# Patient Record
Sex: FEMALE | Race: WHITE | NOT HISPANIC OR LATINO | Employment: OTHER | URBAN - METROPOLITAN AREA
[De-identification: names, ages, dates, MRNs, and addresses within clinical notes are randomized per-mention and may not be internally consistent; named-entity substitution may affect disease eponyms.]

---

## 2023-03-22 DIAGNOSIS — I10 PRIMARY HYPERTENSION: Primary | ICD-10-CM

## 2023-03-22 RX ORDER — TRIAMTERENE AND HYDROCHLOROTHIAZIDE 37.5; 25 MG/1; MG/1
1 CAPSULE ORAL DAILY
COMMUNITY
Start: 2015-08-04 | End: 2023-03-22 | Stop reason: SDUPTHER

## 2023-03-24 RX ORDER — TRIAMTERENE AND HYDROCHLOROTHIAZIDE 37.5; 25 MG/1; MG/1
1 CAPSULE ORAL DAILY
Qty: 90 CAPSULE | Refills: 3 | Status: SHIPPED | OUTPATIENT
Start: 2023-03-24 | End: 2024-03-18 | Stop reason: SDUPTHER

## 2023-05-08 DIAGNOSIS — F41.9 ANXIETY: ICD-10-CM

## 2023-05-08 RX ORDER — BUSPIRONE HYDROCHLORIDE 10 MG/1
10 TABLET ORAL 2 TIMES DAILY
COMMUNITY
End: 2023-05-08 | Stop reason: SDUPTHER

## 2023-05-09 RX ORDER — BUSPIRONE HYDROCHLORIDE 10 MG/1
10 TABLET ORAL 2 TIMES DAILY
Qty: 180 TABLET | Refills: 3 | Status: SHIPPED | OUTPATIENT
Start: 2023-05-09 | End: 2024-05-08

## 2023-06-02 DIAGNOSIS — Z86.39 HISTORY OF ELEVATED LIPIDS: ICD-10-CM

## 2023-06-02 RX ORDER — ATORVASTATIN CALCIUM 20 MG/1
20 TABLET, FILM COATED ORAL NIGHTLY
Qty: 30 TABLET | Refills: 0 | Status: SHIPPED | OUTPATIENT
Start: 2023-06-02 | End: 2023-06-29

## 2023-06-02 RX ORDER — ATORVASTATIN CALCIUM 20 MG/1
20 TABLET, FILM COATED ORAL NIGHTLY
COMMUNITY
End: 2023-06-02 | Stop reason: SDUPTHER

## 2023-06-29 DIAGNOSIS — Z86.39 HISTORY OF ELEVATED LIPIDS: ICD-10-CM

## 2023-06-29 RX ORDER — ATORVASTATIN CALCIUM 20 MG/1
20 TABLET, FILM COATED ORAL NIGHTLY
Qty: 90 TABLET | Refills: 0 | Status: SHIPPED | OUTPATIENT
Start: 2023-06-29 | End: 2023-09-28 | Stop reason: SDUPTHER

## 2023-08-03 ENCOUNTER — LAB (OUTPATIENT)
Dept: LAB | Facility: LAB | Age: 73
End: 2023-08-03
Payer: MEDICARE

## 2023-08-03 DIAGNOSIS — Z00.00 HEALTHCARE MAINTENANCE: ICD-10-CM

## 2023-08-03 LAB
ALANINE AMINOTRANSFERASE (SGPT) (U/L) IN SER/PLAS: 31 U/L (ref 7–45)
ALBUMIN (G/DL) IN SER/PLAS: 4.2 G/DL (ref 3.4–5)
ALKALINE PHOSPHATASE (U/L) IN SER/PLAS: 85 U/L (ref 33–136)
ANION GAP IN SER/PLAS: 13 MMOL/L (ref 10–20)
ASPARTATE AMINOTRANSFERASE (SGOT) (U/L) IN SER/PLAS: 25 U/L (ref 9–39)
BASOPHILS (10*3/UL) IN BLOOD BY AUTOMATED COUNT: 0.02 X10E9/L (ref 0–0.1)
BASOPHILS/100 LEUKOCYTES IN BLOOD BY AUTOMATED COUNT: 0.3 % (ref 0–2)
BILIRUBIN TOTAL (MG/DL) IN SER/PLAS: 0.4 MG/DL (ref 0–1.2)
CALCIUM (MG/DL) IN SER/PLAS: 9.7 MG/DL (ref 8.6–10.6)
CARBON DIOXIDE, TOTAL (MMOL/L) IN SER/PLAS: 29 MMOL/L (ref 21–32)
CHLORIDE (MMOL/L) IN SER/PLAS: 101 MMOL/L (ref 98–107)
CHOLESTEROL (MG/DL) IN SER/PLAS: 261 MG/DL (ref 0–199)
CHOLESTEROL IN HDL (MG/DL) IN SER/PLAS: 51.6 MG/DL
CHOLESTEROL/HDL RATIO: 5.1
CREATININE (MG/DL) IN SER/PLAS: 0.65 MG/DL (ref 0.5–1.05)
EOSINOPHILS (10*3/UL) IN BLOOD BY AUTOMATED COUNT: 0.12 X10E9/L (ref 0–0.4)
EOSINOPHILS/100 LEUKOCYTES IN BLOOD BY AUTOMATED COUNT: 2 % (ref 0–6)
ERYTHROCYTE DISTRIBUTION WIDTH (RATIO) BY AUTOMATED COUNT: 13.7 % (ref 11.5–14.5)
ERYTHROCYTE MEAN CORPUSCULAR HEMOGLOBIN CONCENTRATION (G/DL) BY AUTOMATED: 32.9 G/DL (ref 32–36)
ERYTHROCYTE MEAN CORPUSCULAR VOLUME (FL) BY AUTOMATED COUNT: 101 FL (ref 80–100)
ERYTHROCYTES (10*6/UL) IN BLOOD BY AUTOMATED COUNT: 4.47 X10E12/L (ref 4–5.2)
GFR FEMALE: >90 ML/MIN/1.73M2
GLUCOSE (MG/DL) IN SER/PLAS: 107 MG/DL (ref 74–99)
HEMATOCRIT (%) IN BLOOD BY AUTOMATED COUNT: 45.3 % (ref 36–46)
HEMOGLOBIN (G/DL) IN BLOOD: 14.9 G/DL (ref 12–16)
IMMATURE GRANULOCYTES/100 LEUKOCYTES IN BLOOD BY AUTOMATED COUNT: 0.3 % (ref 0–0.9)
LDL: ABNORMAL MG/DL (ref 0–99)
LEUKOCYTES (10*3/UL) IN BLOOD BY AUTOMATED COUNT: 6.1 X10E9/L (ref 4.4–11.3)
LYMPHOCYTES (10*3/UL) IN BLOOD BY AUTOMATED COUNT: 1.74 X10E9/L (ref 0.8–3)
LYMPHOCYTES/100 LEUKOCYTES IN BLOOD BY AUTOMATED COUNT: 28.5 % (ref 13–44)
MONOCYTES (10*3/UL) IN BLOOD BY AUTOMATED COUNT: 0.4 X10E9/L (ref 0.05–0.8)
MONOCYTES/100 LEUKOCYTES IN BLOOD BY AUTOMATED COUNT: 6.6 % (ref 2–10)
NEUTROPHILS (10*3/UL) IN BLOOD BY AUTOMATED COUNT: 3.8 X10E9/L (ref 1.6–5.5)
NEUTROPHILS/100 LEUKOCYTES IN BLOOD BY AUTOMATED COUNT: 62.3 % (ref 40–80)
NON HDL CHOLESTEROL: 209 MG/DL
NRBC (PER 100 WBCS) BY AUTOMATED COUNT: 0 /100 WBC (ref 0–0)
PLATELETS (10*3/UL) IN BLOOD AUTOMATED COUNT: 280 X10E9/L (ref 150–450)
POTASSIUM (MMOL/L) IN SER/PLAS: 4.5 MMOL/L (ref 3.5–5.3)
PROTEIN TOTAL: 6.7 G/DL (ref 6.4–8.2)
SODIUM (MMOL/L) IN SER/PLAS: 138 MMOL/L (ref 136–145)
THYROTROPIN (MIU/L) IN SER/PLAS BY DETECTION LIMIT <= 0.05 MIU/L: 9.83 MIU/L (ref 0.44–3.98)
THYROXINE (T4) FREE (NG/DL) IN SER/PLAS: 0.97 NG/DL (ref 0.78–1.48)
TRIGLYCERIDE (MG/DL) IN SER/PLAS: 490 MG/DL (ref 0–149)
UREA NITROGEN (MG/DL) IN SER/PLAS: 15 MG/DL (ref 6–23)
VLDL: ABNORMAL MG/DL (ref 0–40)

## 2023-08-03 PROCEDURE — 84443 ASSAY THYROID STIM HORMONE: CPT

## 2023-08-03 PROCEDURE — 80061 LIPID PANEL: CPT

## 2023-08-03 PROCEDURE — 80053 COMPREHEN METABOLIC PANEL: CPT

## 2023-08-03 PROCEDURE — 36415 COLL VENOUS BLD VENIPUNCTURE: CPT

## 2023-08-03 PROCEDURE — 84439 ASSAY OF FREE THYROXINE: CPT

## 2023-08-03 PROCEDURE — 85025 COMPLETE CBC W/AUTO DIFF WBC: CPT

## 2023-08-11 ENCOUNTER — OFFICE VISIT (OUTPATIENT)
Dept: PRIMARY CARE | Facility: CLINIC | Age: 73
End: 2023-08-11
Payer: MEDICARE

## 2023-08-11 VITALS
WEIGHT: 160.6 LBS | RESPIRATION RATE: 18 BRPM | SYSTOLIC BLOOD PRESSURE: 93 MMHG | HEART RATE: 94 BPM | OXYGEN SATURATION: 97 % | DIASTOLIC BLOOD PRESSURE: 68 MMHG | HEIGHT: 61 IN | BODY MASS INDEX: 30.32 KG/M2

## 2023-08-11 DIAGNOSIS — E03.9 HYPOTHYROIDISM, UNSPECIFIED TYPE: ICD-10-CM

## 2023-08-11 DIAGNOSIS — Z00.00 ANNUAL PHYSICAL EXAM: Primary | ICD-10-CM

## 2023-08-11 DIAGNOSIS — Z00.00 HEALTHCARE MAINTENANCE: ICD-10-CM

## 2023-08-11 PROBLEM — M25.612 DECREASED RANGE OF MOTION OF LEFT SHOULDER: Status: ACTIVE | Noted: 2023-08-11

## 2023-08-11 PROBLEM — H61.23 IMPACTED CERUMEN OF BOTH EARS: Status: ACTIVE | Noted: 2023-08-11

## 2023-08-11 PROBLEM — R25.1 TREMOR: Status: ACTIVE | Noted: 2023-08-11

## 2023-08-11 PROBLEM — R29.898 WEAKNESS OF LEFT UPPER EXTREMITY: Status: ACTIVE | Noted: 2023-08-11

## 2023-08-11 PROBLEM — F41.9 ANXIETY: Status: ACTIVE | Noted: 2023-08-11

## 2023-08-11 PROBLEM — R05.9 COUGH: Status: ACTIVE | Noted: 2023-08-11

## 2023-08-11 PROBLEM — R20.2 HAND TINGLING: Status: ACTIVE | Noted: 2023-08-11

## 2023-08-11 PROBLEM — E78.2 ELEVATED CHOLESTEROL WITH ELEVATED TRIGLYCERIDES: Status: ACTIVE | Noted: 2023-08-11

## 2023-08-11 PROBLEM — I67.1 MIDDLE CEREBRAL ARTERY ANEURYSM (HHS-HCC): Status: ACTIVE | Noted: 2023-08-11

## 2023-08-11 PROBLEM — I10 HTN (HYPERTENSION): Status: ACTIVE | Noted: 2023-08-11

## 2023-08-11 PROBLEM — G25.2: Status: ACTIVE | Noted: 2023-08-11

## 2023-08-11 PROBLEM — R92.8 ABNORMAL MAMMOGRAM: Status: ACTIVE | Noted: 2023-08-11

## 2023-08-11 PROBLEM — R32 URINARY INCONTINENCE: Status: ACTIVE | Noted: 2023-08-11

## 2023-08-11 PROBLEM — R42 DIZZINESS: Status: ACTIVE | Noted: 2023-08-11

## 2023-08-11 PROBLEM — Z98.890 HISTORY OF CRANIOPLASTY: Status: ACTIVE | Noted: 2023-08-11

## 2023-08-11 PROBLEM — I69.319 RESIDUAL COGNITIVE DEFICIT AS LATE EFFECT OF STROKE: Status: ACTIVE | Noted: 2023-08-11

## 2023-08-11 PROBLEM — R20.2 TINGLING OF LEFT UPPER EXTREMITY: Status: ACTIVE | Noted: 2023-08-11

## 2023-08-11 PROCEDURE — 1159F MED LIST DOCD IN RCRD: CPT | Performed by: INTERNAL MEDICINE

## 2023-08-11 PROCEDURE — G0439 PPPS, SUBSEQ VISIT: HCPCS | Performed by: INTERNAL MEDICINE

## 2023-08-11 PROCEDURE — 1170F FXNL STATUS ASSESSED: CPT | Performed by: INTERNAL MEDICINE

## 2023-08-11 PROCEDURE — 3078F DIAST BP <80 MM HG: CPT | Performed by: INTERNAL MEDICINE

## 2023-08-11 PROCEDURE — 1036F TOBACCO NON-USER: CPT | Performed by: INTERNAL MEDICINE

## 2023-08-11 PROCEDURE — 3074F SYST BP LT 130 MM HG: CPT | Performed by: INTERNAL MEDICINE

## 2023-08-11 RX ORDER — CLINDAMYCIN PHOSPHATE 10 MG/G
GEL TOPICAL
COMMUNITY
Start: 2023-07-11

## 2023-08-11 RX ORDER — LEVOTHYROXINE SODIUM 75 UG/1
75 TABLET ORAL DAILY
COMMUNITY
End: 2023-08-11 | Stop reason: ALTCHOICE

## 2023-08-11 RX ORDER — LEVETIRACETAM 500 MG/1
500 TABLET ORAL 2 TIMES DAILY
COMMUNITY
End: 2024-01-03 | Stop reason: SDUPTHER

## 2023-08-11 RX ORDER — PRIMIDONE 50 MG/1
TABLET ORAL
COMMUNITY

## 2023-08-11 RX ORDER — LEVOTHYROXINE SODIUM 88 UG/1
88 TABLET ORAL DAILY
Qty: 90 TABLET | Refills: 3 | Status: SHIPPED | OUTPATIENT
Start: 2023-08-11 | End: 2024-08-10

## 2023-08-11 ASSESSMENT — PATIENT HEALTH QUESTIONNAIRE - PHQ9
2. FEELING DOWN, DEPRESSED OR HOPELESS: NOT AT ALL
1. LITTLE INTEREST OR PLEASURE IN DOING THINGS: NOT AT ALL
1. LITTLE INTEREST OR PLEASURE IN DOING THINGS: SEVERAL DAYS
SUM OF ALL RESPONSES TO PHQ9 QUESTIONS 1 AND 2: 0
10. IF YOU CHECKED OFF ANY PROBLEMS, HOW DIFFICULT HAVE THESE PROBLEMS MADE IT FOR YOU TO DO YOUR WORK, TAKE CARE OF THINGS AT HOME, OR GET ALONG WITH OTHER PEOPLE: SOMEWHAT DIFFICULT
SUM OF ALL RESPONSES TO PHQ9 QUESTIONS 1 AND 2: 1
2. FEELING DOWN, DEPRESSED OR HOPELESS: NOT AT ALL

## 2023-08-11 ASSESSMENT — ACTIVITIES OF DAILY LIVING (ADL)
DRESSING: INDEPENDENT
BATHING: INDEPENDENT
MANAGING_FINANCES: TOTAL CARE
GROCERY_SHOPPING: NEEDS ASSISTANCE
TAKING_MEDICATION: INDEPENDENT
DOING_HOUSEWORK: NEEDS ASSISTANCE

## 2023-08-11 ASSESSMENT — ENCOUNTER SYMPTOMS
DEPRESSION: 0
OCCASIONAL FEELINGS OF UNSTEADINESS: 0
LOSS OF SENSATION IN FEET: 0

## 2023-08-11 NOTE — PROGRESS NOTES
"Subjective   Patient ID: Paola Gonzales is a 73 y.o. female who presents for Medicare Annual Wellness Visit Subsequent.    73 F    H/o  SAH x 10 years    Rubral Tremor    Anxiety/ATS                        HPI     Review of Systems      No Fever/chills/headaches/dizziness/chest pains/ shortness of breath/palpitations/Nausea/vomiting/diarrhea/ constipation/urine frequency/blood in urine.      Objective   BP 93/68 (BP Location: Left arm, Patient Position: Sitting, BP Cuff Size: Adult)   Pulse 94   Resp 18   Ht 1.542 m (5' 0.71\")   Wt 72.8 kg (160 lb 9.6 oz)   SpO2 97%   BMI 30.64 kg/m²     Physical Exam    No JVP elevation. No palpable Lymph Nodes. No Thyromegaly    CVS-NL S1/S2 . No MRG    Lungs-CTA. B/S= B/L    Abdomen-Soft, Non-tender. No masses or HSM    Extremities: No C/C/E    CNS:    Rubral Tremor    A+OX3              Assessment/Plan        73 F    H/o  SAH x 10 years    Rubral Tremor    Anxiety/ATS    Elevated TSH    Plan:    Continue with THC 5mg    Increase Synthroid 88 mcg daily    TSH/Free T4 in 8 weeks    Follow up/ Call with any concerns    Follow up in 12 months /PRN          "

## 2023-09-28 DIAGNOSIS — Z86.39 HISTORY OF ELEVATED LIPIDS: ICD-10-CM

## 2023-09-28 RX ORDER — ATORVASTATIN CALCIUM 20 MG/1
20 TABLET, FILM COATED ORAL NIGHTLY
Qty: 90 TABLET | Refills: 3 | Status: SHIPPED | OUTPATIENT
Start: 2023-09-28 | End: 2024-03-29 | Stop reason: SDUPTHER

## 2023-09-30 ENCOUNTER — APPOINTMENT (OUTPATIENT)
Dept: RADIOLOGY | Facility: HOSPITAL | Age: 73
End: 2023-09-30
Payer: MEDICARE

## 2023-09-30 ENCOUNTER — APPOINTMENT (OUTPATIENT)
Dept: CARDIOLOGY | Facility: HOSPITAL | Age: 73
End: 2023-09-30
Payer: MEDICARE

## 2023-09-30 ENCOUNTER — HOSPITAL ENCOUNTER (EMERGENCY)
Facility: HOSPITAL | Age: 73
Discharge: HOME | End: 2023-09-30
Attending: EMERGENCY MEDICINE
Payer: MEDICARE

## 2023-09-30 VITALS
WEIGHT: 164.9 LBS | RESPIRATION RATE: 20 BRPM | DIASTOLIC BLOOD PRESSURE: 74 MMHG | SYSTOLIC BLOOD PRESSURE: 124 MMHG | OXYGEN SATURATION: 93 % | TEMPERATURE: 98.2 F | BODY MASS INDEX: 31.46 KG/M2 | HEART RATE: 82 BPM

## 2023-09-30 DIAGNOSIS — R20.2 PARESTHESIA: ICD-10-CM

## 2023-09-30 DIAGNOSIS — I63.9 STROKE (CEREBRUM) (MULTI): Primary | ICD-10-CM

## 2023-09-30 LAB
ALBUMIN SERPL BCP-MCNC: 4.1 G/DL (ref 3.4–5)
ALP SERPL-CCNC: 74 U/L (ref 33–136)
ALT SERPL W P-5'-P-CCNC: 31 U/L (ref 7–45)
ANION GAP SERPL CALC-SCNC: 16 MMOL/L (ref 10–20)
APTT PPP: 34 SECONDS (ref 27–38)
AST SERPL W P-5'-P-CCNC: 28 U/L (ref 9–39)
BASOPHILS # BLD AUTO: 0.02 X10*3/UL (ref 0–0.1)
BASOPHILS NFR BLD AUTO: 0.3 %
BILIRUB SERPL-MCNC: 0.3 MG/DL (ref 0–1.2)
BUN SERPL-MCNC: 12 MG/DL (ref 6–23)
CALCIUM SERPL-MCNC: 8.9 MG/DL (ref 8.6–10.3)
CARDIAC TROPONIN I PNL SERPL HS: 4 NG/L (ref 0–13)
CHLORIDE SERPL-SCNC: 101 MMOL/L (ref 98–107)
CO2 SERPL-SCNC: 23 MMOL/L (ref 21–32)
CREAT SERPL-MCNC: 0.61 MG/DL (ref 0.5–1.05)
EOSINOPHIL # BLD AUTO: 0.09 X10*3/UL (ref 0–0.4)
EOSINOPHIL NFR BLD AUTO: 1.2 %
ERYTHROCYTE [DISTWIDTH] IN BLOOD BY AUTOMATED COUNT: 13.3 % (ref 11.5–14.5)
GFR SERPL CREATININE-BSD FRML MDRD: >90 ML/MIN/1.73M*2
GLUCOSE SERPL-MCNC: 131 MG/DL (ref 74–99)
HCT VFR BLD AUTO: 44 % (ref 36–46)
HGB BLD-MCNC: 15.1 G/DL (ref 12–16)
IMM GRANULOCYTES # BLD AUTO: 0.03 X10*3/UL (ref 0–0.5)
IMM GRANULOCYTES NFR BLD AUTO: 0.4 % (ref 0–0.9)
INR PPP: 1.1 (ref 0.9–1.1)
LYMPHOCYTES # BLD AUTO: 1.96 X10*3/UL (ref 0.8–3)
LYMPHOCYTES NFR BLD AUTO: 25.3 %
MCH RBC QN AUTO: 33 PG (ref 26–34)
MCHC RBC AUTO-ENTMCNC: 34.3 G/DL (ref 32–36)
MCV RBC AUTO: 96 FL (ref 80–100)
MONOCYTES # BLD AUTO: 0.52 X10*3/UL (ref 0.05–0.8)
MONOCYTES NFR BLD AUTO: 6.7 %
NEUTROPHILS # BLD AUTO: 5.13 X10*3/UL (ref 1.6–5.5)
NEUTROPHILS NFR BLD AUTO: 66.1 %
NRBC BLD-RTO: 0 /100 WBCS (ref 0–0)
PLATELET # BLD AUTO: 304 X10*3/UL (ref 150–450)
PMV BLD AUTO: 9.4 FL (ref 7.5–11.5)
POTASSIUM SERPL-SCNC: 3.6 MMOL/L (ref 3.5–5.3)
PROT SERPL-MCNC: 6.5 G/DL (ref 6.4–8.2)
PROTHROMBIN TIME: 12.2 SECONDS (ref 9.8–12.8)
RBC # BLD AUTO: 4.57 X10*6/UL (ref 4–5.2)
SODIUM SERPL-SCNC: 136 MMOL/L (ref 136–145)
WBC # BLD AUTO: 7.8 X10*3/UL (ref 4.4–11.3)

## 2023-09-30 PROCEDURE — 80053 COMPREHEN METABOLIC PANEL: CPT | Performed by: EMERGENCY MEDICINE

## 2023-09-30 PROCEDURE — 71045 X-RAY EXAM CHEST 1 VIEW: CPT | Mod: FY

## 2023-09-30 PROCEDURE — 85730 THROMBOPLASTIN TIME PARTIAL: CPT | Performed by: EMERGENCY MEDICINE

## 2023-09-30 PROCEDURE — 99285 EMERGENCY DEPT VISIT HI MDM: CPT | Mod: 25 | Performed by: EMERGENCY MEDICINE

## 2023-09-30 PROCEDURE — 36415 COLL VENOUS BLD VENIPUNCTURE: CPT | Performed by: EMERGENCY MEDICINE

## 2023-09-30 PROCEDURE — 85610 PROTHROMBIN TIME: CPT | Performed by: EMERGENCY MEDICINE

## 2023-09-30 PROCEDURE — 70450 CT HEAD/BRAIN W/O DYE: CPT

## 2023-09-30 PROCEDURE — 70450 CT HEAD/BRAIN W/O DYE: CPT | Performed by: RADIOLOGY

## 2023-09-30 PROCEDURE — 99284 EMERGENCY DEPT VISIT MOD MDM: CPT | Performed by: EMERGENCY MEDICINE

## 2023-09-30 PROCEDURE — 93005 ELECTROCARDIOGRAM TRACING: CPT

## 2023-09-30 PROCEDURE — 85025 COMPLETE CBC W/AUTO DIFF WBC: CPT | Performed by: EMERGENCY MEDICINE

## 2023-09-30 PROCEDURE — 84484 ASSAY OF TROPONIN QUANT: CPT | Performed by: EMERGENCY MEDICINE

## 2023-09-30 PROCEDURE — 71045 X-RAY EXAM CHEST 1 VIEW: CPT | Performed by: RADIOLOGY

## 2023-09-30 ASSESSMENT — PAIN - FUNCTIONAL ASSESSMENT: PAIN_FUNCTIONAL_ASSESSMENT: 0-10

## 2023-09-30 ASSESSMENT — COLUMBIA-SUICIDE SEVERITY RATING SCALE - C-SSRS
6. HAVE YOU EVER DONE ANYTHING, STARTED TO DO ANYTHING, OR PREPARED TO DO ANYTHING TO END YOUR LIFE?: NO
1. IN THE PAST MONTH, HAVE YOU WISHED YOU WERE DEAD OR WISHED YOU COULD GO TO SLEEP AND NOT WAKE UP?: NO
2. HAVE YOU ACTUALLY HAD ANY THOUGHTS OF KILLING YOURSELF?: NO

## 2023-09-30 ASSESSMENT — PAIN SCALES - GENERAL: PAINLEVEL_OUTOF10: 0 - NO PAIN

## 2023-09-30 NOTE — ED PROVIDER NOTES
HPI   Chief Complaint   Patient presents with    Stroke     PT TO ED WITH C/O FACIAL NUMBNESS. LKW 1230. UPON ARRIVAL S/S HAVE RESOLVED. HX BRAIN ANEURYSM WITH TITANIUM MESH. DENIES WEAKNESS, FACIAL DROOP, SLURRED SPEECH, DIZZINESS       History of present illness 73-year-old female arrives with paresthesias to the top of her head.  She has had prior brain surgery with a titanium clip for subarachnoid hemorrhage.  She has had no seizure activity she has no focal deficits for either motor or sensory.  The paresthesias are only felt on her scalp.  There is no nausea vomiting there is no chest pain there is no shortness of breath.  There is complete resolution of symptoms on arrival.        ROS  General Appears in distress  HEENT: No sore throat, No Visual Loss, No Headache, No Ear Pain  Neck: Denies neck pain  Chest: No chest pain, no pleuritic pain, no chest wall injury  Pulmonary: No SOB, No Cough, No Sputum production, No Wheezing  GI: No abdominal pain, no nausea or vomiting, no diarrhea.  : No dysuria, no frequency, no hematuria.  Extremities: No musculoskeletal pain, normal ambulation, no paresthesia.  Psych: Normal interaction, no anxiety, no depression, no suicidal ideation  Skin: No rashes    ROS is otherwise negative     PE: General: Appears in distress        HEENT: Throat is moist without exudate, midline uvula dentate intact, Tms clear with normal anatomy.        Neck: Supple non tender        Chest CTA, good AE, no wheezing, rales, or rhonci        CVA: RRR S1S2 no S3S4 or murmur        ABD: W/S/NT no HSM, no pulsatile masses, good bowel sounds        Extremities: Excellent distal pulses, brisk capillary refill. Full ROM        Psych: Normal interactions with no signs of depression  or suicidal ideation.        Neuro: Alert and oriented, moves all and feels all.        Results for orders placed or performed during the hospital encounter of 09/30/23   aPTT   Result Value Ref Range    aPTT 34 27 - 38  seconds   Protime-INR   Result Value Ref Range    Protime 12.2 9.8 - 12.8 seconds    INR 1.1 0.9 - 1.1   Comprehensive Metabolic Panel   Result Value Ref Range    Glucose 131 (H) 74 - 99 mg/dL    Sodium 136 136 - 145 mmol/L    Potassium 3.6 3.5 - 5.3 mmol/L    Chloride 101 98 - 107 mmol/L    Bicarbonate 23 21 - 32 mmol/L    Anion Gap 16 10 - 20 mmol/L    Urea Nitrogen 12 6 - 23 mg/dL    Creatinine 0.61 0.50 - 1.05 mg/dL    eGFR >90 >60 mL/min/1.73m*2    Calcium 8.9 8.6 - 10.3 mg/dL    Albumin 4.1 3.4 - 5.0 g/dL    Alkaline Phosphatase 74 33 - 136 U/L    Total Protein 6.5 6.4 - 8.2 g/dL    AST 28 9 - 39 U/L    Bilirubin, Total 0.3 0.0 - 1.2 mg/dL    ALT 31 7 - 45 U/L   CBC and Auto Differential   Result Value Ref Range    WBC 7.8 4.4 - 11.3 x10*3/uL    nRBC 0.0 0.0 - 0.0 /100 WBCs    RBC 4.57 4.00 - 5.20 x10*6/uL    Hemoglobin 15.1 12.0 - 16.0 g/dL    Hematocrit 44.0 36.0 - 46.0 %    MCV 96 80 - 100 fL    MCH 33.0 26.0 - 34.0 pg    MCHC 34.3 32.0 - 36.0 g/dL    RDW 13.3 11.5 - 14.5 %    Platelets 304 150 - 450 x10*3/uL    MPV 9.4 7.5 - 11.5 fL    Neutrophils % 66.1 40.0 - 80.0 %    Immature Granulocytes %, Automated 0.4 0.0 - 0.9 %    Lymphocytes % 25.3 13.0 - 44.0 %    Monocytes % 6.7 2.0 - 10.0 %    Eosinophils % 1.2 0.0 - 6.0 %    Basophils % 0.3 0.0 - 2.0 %    Neutrophils Absolute 5.13 1.60 - 5.50 x10*3/uL    Immature Granulocytes Absolute, Automated 0.03 0.00 - 0.50 x10*3/uL    Lymphocytes Absolute 1.96 0.80 - 3.00 x10*3/uL    Monocytes Absolute 0.52 0.05 - 0.80 x10*3/uL    Eosinophils Absolute 0.09 0.00 - 0.40 x10*3/uL    Basophils Absolute 0.02 0.00 - 0.10 x10*3/uL   Troponin I, High Sensitivity   Result Value Ref Range    Troponin I, High Sensitivity 4 0 - 13 ng/L      XR chest 1 view   Final Result   1.  Stable chest. See discussion above.                  MACRO:   None        Signed by: Joseph Schoenberger 9/30/2023 3:12 PM   Dictation workstation:   [id number]      CT brain attack head wo IV contrast    Final Result   No acute intracranial abnormalities. Large area of encephalomalacia   on the right consistent with previous surgery.        The findings were discussed with Dr. Ayers at 1:38 p.m..                       Signed by: Alexa Kaplan 9/30/2023 1:39 PM   Dictation workstation:   [id number]                      [place] Coma Scale Score: 15                  Patient History   Past Medical History:   Diagnosis Date    Encounter for screening for malignant neoplasm of colon 09/14/2018    Colon cancer screening    Nontraumatic subarachnoid hemorrhage, unspecified (CMS/[facility]) 08/10/2017    Subarachnoid hemorrhage, nontraumatic    Other conditions influencing health status     Closed Fracture Of The Fifth Metatarsal    Personal history of other infectious and parasitic diseases 09/27/2019    History of wound infection    Unspecified open wound of scalp, initial encounter 09/14/2018    Scalp wound     Past Surgical History:   Procedure Laterality Date    CT HEAD ANGIO W AND WO IV CONTRAST  7/27/2013    CT HEAD ANGIO W AND WO IV CONTRAST 7/27/2013 [facility] CLINICAL LEGACY    CT HEAD ANGIO W AND WO IV CONTRAST  8/6/2013    CT HEAD ANGIO W AND WO IV CONTRAST 8/6/2013 [facility] CLINICAL LEGACY    CT HEAD ANGIO W AND WO IV CONTRAST  10/4/2017    [place] HEAD ANGIO W AND WO IV CONTRAST [place] [facility] [place]    MR HEAD ANGIO W AND WO IV CONTRAST  8/2/2014    MR HEAD ANGIO W AND WO IV CONTRAST [place] [facility] [place]    MR HEAD ANGIO W AND WO IV CONTRAST  10/7/2019    MR HEAD ANGIO W AND WO IV CONTRAST 10[place] [facility] [place] [place]    MR HEAD ANGIO WO IV CONTRAST  8/10/2015    MR HEAD ANGIO WO IV CONTRAST 8[place]10/2015 [facility] [place]    OTHER SURGICAL HISTORY  09/20/2013    Craniotomy (Therapeutic)     No family history on file.  Social History     Tobacco Use    Smoking status: Former     Types: Cigarettes     Quit date: 2013     Years since quitting: 10.7    Smokeless tobacco: Never   Substance Use Topics    Alcohol  use: Not Currently    Drug use: Yes     Types: Marijuana     Comment: medical marijuana       Physical Exam   ED Triage Vitals [09/30/23 1333]   Temp Heart Rate Resp BP   36.8 °C (98.2 °F) 94 16 141/90      SpO2 Temp Source Heart Rate Source Patient Position   99 % Temporal -- --      BP Location FiO2 (%)     -- --       Physical Exam    ED Course & MDM    Patient seen to have no symptoms while staying in the emergency for 3 hours.  Her electrolytes are normal her EKG is normal her CT scan of her head shows no acute change with encephalomalacia of prior subarachnoid hemorrhage stroke area on the right side with a clip in place.  With an NIH score of 0 and the patient who seems to be completely at her baseline she will be discharged with close follow-up.  She was able to give us urine just prior to discharge and I am sending that as well.  Her differential of subarachnoid hemorrhage bleed has been ruled out by CAT scan.  Her differential of stroke also is negative with negative deficits on her NIH and no actual NIH deficits prior to arrival.  This would make her a noncandidate for any lytic therapy.        Medical Decision Making      Procedure  ECG 12 Lead    Performed by: Napoleon Ayers MD  Authorized by: Napoleon Ayers MD    ECG reviewed by ED Physician in the absence of a cardiologist: yes    Previous ECG:     Previous ECG:  Compared to current    Similarity:  No change  Interpretation:     Interpretation: normal    Rate:     ECG rate assessment: normal    Rhythm:     Rhythm: sinus rhythm    Ectopy:     Ectopy: none    QRS:     QRS axis:  Normal  ST segments:     ST segments:  Normal  Q waves:     Abnormal Q-waves: not present         Napoleon Ayers MD  09/30/23 [id number]

## 2023-10-03 ENCOUNTER — HOSPITAL ENCOUNTER (OUTPATIENT)
Dept: RADIOLOGY | Facility: HOSPITAL | Age: 73
Discharge: HOME | End: 2023-10-03
Payer: MEDICARE

## 2023-10-03 DIAGNOSIS — I67.1 CEREBRAL ANEURYSM WITHOUT RUPTURE (HHS-HCC): ICD-10-CM

## 2023-10-03 PROCEDURE — 70546 MR ANGIOGRAPH HEAD W/O&W/DYE: CPT

## 2023-10-03 PROCEDURE — 70546 MR ANGIOGRAPH HEAD W/O&W/DYE: CPT | Performed by: RADIOLOGY

## 2023-10-03 PROCEDURE — 2550000001 HC RX 255 CONTRASTS: Performed by: NEUROLOGICAL SURGERY

## 2023-10-03 PROCEDURE — A9575 INJ GADOTERATE MEGLUMI 0.1ML: HCPCS | Performed by: NEUROLOGICAL SURGERY

## 2023-10-03 RX ORDER — GADOTERATE MEGLUMINE 376.9 MG/ML
15 INJECTION INTRAVENOUS
Status: COMPLETED | OUTPATIENT
Start: 2023-10-03 | End: 2023-10-03

## 2023-10-03 RX ADMIN — GADOTERATE MEGLUMINE 15 ML: 376.9 INJECTION INTRAVENOUS at 15:07

## 2023-10-19 ENCOUNTER — APPOINTMENT (OUTPATIENT)
Dept: LAB | Facility: LAB | Age: 73
End: 2023-10-19
Payer: MEDICARE

## 2023-11-29 ENCOUNTER — TELEPHONE (OUTPATIENT)
Dept: NEUROLOGY | Facility: CLINIC | Age: 73
End: 2023-11-29
Payer: MEDICARE

## 2023-11-29 NOTE — TELEPHONE ENCOUNTER
Called Back- No Answer, Left detailed message to call me back direct with more information on the stomach aches. Both meds were started in Feb and Aug 2022 so would not expect side effects to start recently. Recommended follow up with PCP to investigate other potentail GI issues.

## 2023-11-29 NOTE — TELEPHONE ENCOUNTER
Patient has questions about medication Levetiracetam / Primidone... Stating that she is having stomach aches after she takes medication ... Would like a call back

## 2023-12-15 ENCOUNTER — APPOINTMENT (OUTPATIENT)
Dept: RADIOLOGY | Facility: HOSPITAL | Age: 73
End: 2023-12-15
Payer: MEDICARE

## 2023-12-18 DIAGNOSIS — Z00.00 ROUTINE GENERAL MEDICAL EXAMINATION AT A HEALTH CARE FACILITY: ICD-10-CM

## 2023-12-18 DIAGNOSIS — I10 HYPERTENSION, UNSPECIFIED TYPE: ICD-10-CM

## 2023-12-22 ENCOUNTER — APPOINTMENT (OUTPATIENT)
Dept: RADIOLOGY | Facility: HOSPITAL | Age: 73
End: 2023-12-22
Payer: MEDICARE

## 2023-12-26 ENCOUNTER — OFFICE VISIT (OUTPATIENT)
Dept: PRIMARY CARE | Facility: CLINIC | Age: 73
End: 2023-12-26
Payer: MEDICARE

## 2023-12-26 DIAGNOSIS — Z23 VACCINE FOR STREPTOCOCCUS PNEUMONIAE AND INFLUENZA: Primary | ICD-10-CM

## 2023-12-26 DIAGNOSIS — Z23 ENCOUNTER FOR IMMUNIZATION: ICD-10-CM

## 2023-12-26 PROCEDURE — 1159F MED LIST DOCD IN RCRD: CPT | Performed by: INTERNAL MEDICINE

## 2023-12-26 PROCEDURE — 1036F TOBACCO NON-USER: CPT | Performed by: INTERNAL MEDICINE

## 2023-12-26 PROCEDURE — 90677 PCV20 VACCINE IM: CPT | Performed by: INTERNAL MEDICINE

## 2023-12-26 PROCEDURE — G0009 ADMIN PNEUMOCOCCAL VACCINE: HCPCS | Performed by: INTERNAL MEDICINE

## 2023-12-26 PROCEDURE — 1126F AMNT PAIN NOTED NONE PRSNT: CPT | Performed by: INTERNAL MEDICINE

## 2023-12-26 NOTE — PROGRESS NOTES
Patient came in today to get her prevnar 20. She received it in her right arm and was able to get up and walk out the office with no issues or concerns.       Niyah Garcia MA   
No

## 2024-01-03 DIAGNOSIS — R25.1 TREMOR: ICD-10-CM

## 2024-01-03 RX ORDER — LEVETIRACETAM 500 MG/1
500 TABLET ORAL 2 TIMES DAILY
Qty: 180 TABLET | Refills: 0 | Status: SHIPPED | OUTPATIENT
Start: 2024-01-03 | End: 2024-04-02

## 2024-01-18 ENCOUNTER — LAB (OUTPATIENT)
Dept: LAB | Facility: LAB | Age: 74
End: 2024-01-18
Payer: MEDICARE

## 2024-01-18 DIAGNOSIS — E03.9 HYPOTHYROIDISM, UNSPECIFIED TYPE: ICD-10-CM

## 2024-01-18 DIAGNOSIS — Z00.00 ROUTINE GENERAL MEDICAL EXAMINATION AT A HEALTH CARE FACILITY: ICD-10-CM

## 2024-01-18 DIAGNOSIS — Z00.00 ANNUAL PHYSICAL EXAM: ICD-10-CM

## 2024-01-18 DIAGNOSIS — I10 HYPERTENSION, UNSPECIFIED TYPE: ICD-10-CM

## 2024-01-18 LAB
ALBUMIN SERPL BCP-MCNC: 4.3 G/DL (ref 3.4–5)
ALP SERPL-CCNC: 70 U/L (ref 33–136)
ALT SERPL W P-5'-P-CCNC: 34 U/L (ref 7–45)
ANION GAP SERPL CALC-SCNC: 14 MMOL/L (ref 10–20)
AST SERPL W P-5'-P-CCNC: 23 U/L (ref 9–39)
BASOPHILS # BLD AUTO: 0.02 X10*3/UL (ref 0–0.1)
BASOPHILS NFR BLD AUTO: 0.3 %
BILIRUB SERPL-MCNC: 0.5 MG/DL (ref 0–1.2)
BUN SERPL-MCNC: 13 MG/DL (ref 6–23)
CALCIUM SERPL-MCNC: 9.7 MG/DL (ref 8.6–10.6)
CHLORIDE SERPL-SCNC: 101 MMOL/L (ref 98–107)
CHOLEST SERPL-MCNC: 292 MG/DL (ref 0–199)
CHOLESTEROL/HDL RATIO: 6.7
CO2 SERPL-SCNC: 27 MMOL/L (ref 21–32)
CREAT SERPL-MCNC: 0.64 MG/DL (ref 0.5–1.05)
EGFRCR SERPLBLD CKD-EPI 2021: >90 ML/MIN/1.73M*2
EOSINOPHIL # BLD AUTO: 0.08 X10*3/UL (ref 0–0.4)
EOSINOPHIL NFR BLD AUTO: 1.2 %
ERYTHROCYTE [DISTWIDTH] IN BLOOD BY AUTOMATED COUNT: 13.1 % (ref 11.5–14.5)
GLUCOSE SERPL-MCNC: 115 MG/DL (ref 74–99)
HCT VFR BLD AUTO: 47.5 % (ref 36–46)
HDLC SERPL-MCNC: 43.7 MG/DL
HGB BLD-MCNC: 16.1 G/DL (ref 12–16)
IMM GRANULOCYTES # BLD AUTO: 0.02 X10*3/UL (ref 0–0.5)
IMM GRANULOCYTES NFR BLD AUTO: 0.3 % (ref 0–0.9)
LDLC SERPL CALC-MCNC: ABNORMAL MG/DL
LYMPHOCYTES # BLD AUTO: 1.72 X10*3/UL (ref 0.8–3)
LYMPHOCYTES NFR BLD AUTO: 26.2 %
MCH RBC QN AUTO: 33.2 PG (ref 26–34)
MCHC RBC AUTO-ENTMCNC: 33.9 G/DL (ref 32–36)
MCV RBC AUTO: 98 FL (ref 80–100)
MONOCYTES # BLD AUTO: 0.41 X10*3/UL (ref 0.05–0.8)
MONOCYTES NFR BLD AUTO: 6.2 %
NEUTROPHILS # BLD AUTO: 4.32 X10*3/UL (ref 1.6–5.5)
NEUTROPHILS NFR BLD AUTO: 65.8 %
NON HDL CHOLESTEROL: 248 MG/DL (ref 0–149)
NRBC BLD-RTO: 0 /100 WBCS (ref 0–0)
PLATELET # BLD AUTO: 340 X10*3/UL (ref 150–450)
POTASSIUM SERPL-SCNC: 4.4 MMOL/L (ref 3.5–5.3)
PROT SERPL-MCNC: 6.9 G/DL (ref 6.4–8.2)
RBC # BLD AUTO: 4.85 X10*6/UL (ref 4–5.2)
SODIUM SERPL-SCNC: 138 MMOL/L (ref 136–145)
TRIGL SERPL-MCNC: 434 MG/DL (ref 0–149)
TSH SERPL-ACNC: 3 MIU/L (ref 0.44–3.98)
VLDL: ABNORMAL
WBC # BLD AUTO: 6.6 X10*3/UL (ref 4.4–11.3)

## 2024-01-18 PROCEDURE — 85025 COMPLETE CBC W/AUTO DIFF WBC: CPT

## 2024-01-18 PROCEDURE — 80053 COMPREHEN METABOLIC PANEL: CPT

## 2024-01-18 PROCEDURE — 84443 ASSAY THYROID STIM HORMONE: CPT

## 2024-01-18 PROCEDURE — 36415 COLL VENOUS BLD VENIPUNCTURE: CPT

## 2024-01-18 PROCEDURE — 80061 LIPID PANEL: CPT

## 2024-01-21 DIAGNOSIS — E78.2 ELEVATED TRIGLYCERIDES WITH HIGH CHOLESTEROL: Primary | ICD-10-CM

## 2024-01-21 DIAGNOSIS — Z00.00 ANNUAL PHYSICAL EXAM: ICD-10-CM

## 2024-01-21 RX ORDER — ICOSAPENT ETHYL 1 G/1
1 CAPSULE ORAL DAILY
Qty: 90 CAPSULE | Refills: 3 | Status: SHIPPED | OUTPATIENT
Start: 2024-01-21 | End: 2024-04-01 | Stop reason: SDUPTHER

## 2024-02-06 ENCOUNTER — HOSPITAL ENCOUNTER (OUTPATIENT)
Dept: RADIOLOGY | Facility: HOSPITAL | Age: 74
Discharge: HOME | End: 2024-02-06
Payer: MEDICARE

## 2024-02-06 DIAGNOSIS — I67.1 CEREBRAL ANEURYSM, NONRUPTURED (HHS-HCC): ICD-10-CM

## 2024-02-06 PROCEDURE — 70546 MR ANGIOGRAPH HEAD W/O&W/DYE: CPT

## 2024-02-06 PROCEDURE — 2550000001 HC RX 255 CONTRASTS

## 2024-02-06 PROCEDURE — 70546 MR ANGIOGRAPH HEAD W/O&W/DYE: CPT | Performed by: STUDENT IN AN ORGANIZED HEALTH CARE EDUCATION/TRAINING PROGRAM

## 2024-02-06 PROCEDURE — A9575 INJ GADOTERATE MEGLUMI 0.1ML: HCPCS

## 2024-02-06 RX ORDER — GADOTERATE MEGLUMINE 376.9 MG/ML
15 INJECTION INTRAVENOUS
Status: COMPLETED | OUTPATIENT
Start: 2024-02-06 | End: 2024-02-06

## 2024-02-06 RX ADMIN — GADOTERATE MEGLUMINE 14 ML: 376.9 INJECTION INTRAVENOUS at 18:50

## 2024-02-22 ENCOUNTER — LAB (OUTPATIENT)
Dept: LAB | Facility: LAB | Age: 74
End: 2024-02-22
Payer: MEDICARE

## 2024-02-22 DIAGNOSIS — Z00.00 ANNUAL PHYSICAL EXAM: ICD-10-CM

## 2024-02-22 LAB
CHOLEST SERPL-MCNC: 277 MG/DL (ref 0–199)
CHOLESTEROL/HDL RATIO: 6.3
HDLC SERPL-MCNC: 44.3 MG/DL
LDLC SERPL CALC-MCNC: ABNORMAL MG/DL
NON HDL CHOLESTEROL: 233 MG/DL (ref 0–149)
TRIGL SERPL-MCNC: 405 MG/DL (ref 0–149)
VLDL: ABNORMAL

## 2024-02-22 PROCEDURE — 36415 COLL VENOUS BLD VENIPUNCTURE: CPT

## 2024-02-22 PROCEDURE — 80061 LIPID PANEL: CPT

## 2024-03-06 ENCOUNTER — MULTIDISCIPLINARY MEETING (OUTPATIENT)
Dept: NEUROSURGERY | Facility: HOSPITAL | Age: 74
End: 2024-03-06
Payer: MEDICARE

## 2024-03-08 NOTE — PROGRESS NOTES
Cerebrovascular Conference 03/06/24    Case Review: PMH HTN, Hypothyroidism, ruptured R MCA aneurysm s/p clipping in 2013 with Dr. Gipson c/b postop infection s/p multiple washouts and mesh cranioplasty. Known 2mm L mCA aneurysm. Repeat MRA stable with L MCA aneurysm.       Recommendations:  MRA head w/wo in 2 years       Cerebrovascular conference is a multidisciplinary conferences attended by neurosurgery, neuro-radiology, APPs, and Rns.

## 2024-03-11 DIAGNOSIS — Z12.31 VISIT FOR SCREENING MAMMOGRAM: Primary | ICD-10-CM

## 2024-03-14 ENCOUNTER — APPOINTMENT (OUTPATIENT)
Dept: NEUROLOGY | Facility: CLINIC | Age: 74
End: 2024-03-14
Payer: MEDICARE

## 2024-03-15 ENCOUNTER — HOSPITAL ENCOUNTER (OUTPATIENT)
Dept: RADIOLOGY | Facility: CLINIC | Age: 74
Discharge: HOME | End: 2024-03-15
Payer: MEDICARE

## 2024-03-15 VITALS — BODY MASS INDEX: 30.8 KG/M2 | HEIGHT: 61 IN | WEIGHT: 163.14 LBS

## 2024-03-15 DIAGNOSIS — Z12.31 VISIT FOR SCREENING MAMMOGRAM: ICD-10-CM

## 2024-03-15 PROCEDURE — 77067 SCR MAMMO BI INCL CAD: CPT | Performed by: RADIOLOGY

## 2024-03-15 PROCEDURE — 77063 BREAST TOMOSYNTHESIS BI: CPT | Performed by: RADIOLOGY

## 2024-03-15 PROCEDURE — 77067 SCR MAMMO BI INCL CAD: CPT

## 2024-03-18 DIAGNOSIS — I10 PRIMARY HYPERTENSION: ICD-10-CM

## 2024-03-18 RX ORDER — TRIAMTERENE AND HYDROCHLOROTHIAZIDE 37.5; 25 MG/1; MG/1
1 CAPSULE ORAL DAILY
Qty: 90 CAPSULE | Refills: 3 | Status: SHIPPED | OUTPATIENT
Start: 2024-03-18 | End: 2025-03-18

## 2024-03-21 ENCOUNTER — APPOINTMENT (OUTPATIENT)
Dept: NEUROLOGY | Facility: CLINIC | Age: 74
End: 2024-03-21
Payer: MEDICARE

## 2024-03-26 ENCOUNTER — PATIENT MESSAGE (OUTPATIENT)
Dept: PRIMARY CARE | Facility: CLINIC | Age: 74
End: 2024-03-26
Payer: MEDICARE

## 2024-03-27 NOTE — PATIENT COMMUNICATION
Spoke to patient and informed her that rmb sent over a years supply to her pharmacy.       Niyah Garcia MA

## 2024-03-29 DIAGNOSIS — Z86.39 HISTORY OF ELEVATED LIPIDS: ICD-10-CM

## 2024-03-29 RX ORDER — ATORVASTATIN CALCIUM 20 MG/1
20 TABLET, FILM COATED ORAL NIGHTLY
Qty: 90 TABLET | Refills: 3 | Status: SHIPPED | OUTPATIENT
Start: 2024-03-29 | End: 2025-03-29

## 2024-04-01 DIAGNOSIS — E78.2 ELEVATED TRIGLYCERIDES WITH HIGH CHOLESTEROL: ICD-10-CM

## 2024-04-01 RX ORDER — ICOSAPENT ETHYL 1 G/1
1 CAPSULE ORAL DAILY
Qty: 90 CAPSULE | Refills: 3 | Status: SHIPPED | OUTPATIENT
Start: 2024-04-01 | End: 2024-04-02 | Stop reason: SDUPTHER

## 2024-04-02 DIAGNOSIS — Z86.39 HISTORY OF ELEVATED LIPIDS: ICD-10-CM

## 2024-04-02 DIAGNOSIS — E78.2 ELEVATED TRIGLYCERIDES WITH HIGH CHOLESTEROL: ICD-10-CM

## 2024-04-02 RX ORDER — ICOSAPENT ETHYL 1 G/1
1 CAPSULE ORAL
Qty: 180 CAPSULE | Refills: 3 | Status: SHIPPED | OUTPATIENT
Start: 2024-04-02 | End: 2025-04-02

## 2024-05-01 DIAGNOSIS — E78.2 ELEVATED CHOLESTEROL WITH ELEVATED TRIGLYCERIDES: ICD-10-CM

## 2024-05-23 ENCOUNTER — LAB (OUTPATIENT)
Dept: LAB | Facility: LAB | Age: 74
End: 2024-05-23
Payer: MEDICARE

## 2024-05-23 DIAGNOSIS — E78.2 ELEVATED CHOLESTEROL WITH ELEVATED TRIGLYCERIDES: ICD-10-CM

## 2024-05-23 LAB
ALBUMIN SERPL BCP-MCNC: 4.1 G/DL (ref 3.4–5)
ALP SERPL-CCNC: 77 U/L (ref 33–136)
ALT SERPL W P-5'-P-CCNC: 22 U/L (ref 7–45)
ANION GAP SERPL CALC-SCNC: 14 MMOL/L (ref 10–20)
AST SERPL W P-5'-P-CCNC: 17 U/L (ref 9–39)
BASOPHILS # BLD AUTO: 0.03 X10*3/UL (ref 0–0.1)
BASOPHILS NFR BLD AUTO: 0.4 %
BILIRUB SERPL-MCNC: 0.4 MG/DL (ref 0–1.2)
BUN SERPL-MCNC: 12 MG/DL (ref 6–23)
CALCIUM SERPL-MCNC: 9.2 MG/DL (ref 8.6–10.6)
CHLORIDE SERPL-SCNC: 99 MMOL/L (ref 98–107)
CHOLEST SERPL-MCNC: 309 MG/DL (ref 0–199)
CHOLESTEROL/HDL RATIO: 6.6
CO2 SERPL-SCNC: 28 MMOL/L (ref 21–32)
CREAT SERPL-MCNC: 0.6 MG/DL (ref 0.5–1.05)
EGFRCR SERPLBLD CKD-EPI 2021: >90 ML/MIN/1.73M*2
EOSINOPHIL # BLD AUTO: 0.11 X10*3/UL (ref 0–0.4)
EOSINOPHIL NFR BLD AUTO: 1.5 %
ERYTHROCYTE [DISTWIDTH] IN BLOOD BY AUTOMATED COUNT: 13.6 % (ref 11.5–14.5)
GLUCOSE SERPL-MCNC: 89 MG/DL (ref 74–99)
HCT VFR BLD AUTO: 47 % (ref 36–46)
HDLC SERPL-MCNC: 46.7 MG/DL
HGB BLD-MCNC: 15 G/DL (ref 12–16)
IMM GRANULOCYTES # BLD AUTO: 0.02 X10*3/UL (ref 0–0.5)
IMM GRANULOCYTES NFR BLD AUTO: 0.3 % (ref 0–0.9)
LDLC SERPL CALC-MCNC: 199 MG/DL
LYMPHOCYTES # BLD AUTO: 1.82 X10*3/UL (ref 0.8–3)
LYMPHOCYTES NFR BLD AUTO: 25.6 %
MCH RBC QN AUTO: 30.9 PG (ref 26–34)
MCHC RBC AUTO-ENTMCNC: 31.9 G/DL (ref 32–36)
MCV RBC AUTO: 97 FL (ref 80–100)
MONOCYTES # BLD AUTO: 0.43 X10*3/UL (ref 0.05–0.8)
MONOCYTES NFR BLD AUTO: 6 %
NEUTROPHILS # BLD AUTO: 4.71 X10*3/UL (ref 1.6–5.5)
NEUTROPHILS NFR BLD AUTO: 66.2 %
NON HDL CHOLESTEROL: 262 MG/DL (ref 0–149)
NRBC BLD-RTO: 0 /100 WBCS (ref 0–0)
PLATELET # BLD AUTO: 315 X10*3/UL (ref 150–450)
POTASSIUM SERPL-SCNC: 4.2 MMOL/L (ref 3.5–5.3)
PROT SERPL-MCNC: 6.7 G/DL (ref 6.4–8.2)
RBC # BLD AUTO: 4.85 X10*6/UL (ref 4–5.2)
SODIUM SERPL-SCNC: 137 MMOL/L (ref 136–145)
TRIGL SERPL-MCNC: 318 MG/DL (ref 0–149)
VLDL: 64 MG/DL (ref 0–40)
WBC # BLD AUTO: 7.1 X10*3/UL (ref 4.4–11.3)

## 2024-05-23 PROCEDURE — 80061 LIPID PANEL: CPT

## 2024-05-23 PROCEDURE — 36415 COLL VENOUS BLD VENIPUNCTURE: CPT

## 2024-05-23 PROCEDURE — 85025 COMPLETE CBC W/AUTO DIFF WBC: CPT

## 2024-05-23 PROCEDURE — 80053 COMPREHEN METABOLIC PANEL: CPT

## 2024-05-27 DIAGNOSIS — E78.2 ELEVATED CHOLESTEROL WITH ELEVATED TRIGLYCERIDES: Primary | ICD-10-CM

## 2024-05-27 RX ORDER — ACETAMINOPHEN 160 MG/5ML
200 SUSPENSION, ORAL (FINAL DOSE FORM) ORAL DAILY
Qty: 90 CAPSULE | Refills: 3 | Status: SHIPPED | OUTPATIENT
Start: 2024-05-27 | End: 2025-05-27

## 2024-05-27 RX ORDER — ROSUVASTATIN CALCIUM 10 MG/1
10 TABLET, COATED ORAL DAILY
Qty: 100 TABLET | Refills: 3 | Status: SHIPPED | OUTPATIENT
Start: 2024-05-27 | End: 2025-07-01

## 2024-07-12 ENCOUNTER — APPOINTMENT (OUTPATIENT)
Dept: NEUROLOGY | Facility: CLINIC | Age: 74
End: 2024-07-12
Payer: MEDICARE

## 2024-07-12 ENCOUNTER — HOSPITAL ENCOUNTER (OUTPATIENT)
Dept: CARDIOLOGY | Facility: CLINIC | Age: 74
Discharge: HOME | End: 2024-07-12
Payer: MEDICARE

## 2024-07-12 VITALS
HEART RATE: 75 BPM | BODY MASS INDEX: 28.7 KG/M2 | WEIGHT: 152 LBS | DIASTOLIC BLOOD PRESSURE: 76 MMHG | HEIGHT: 61 IN | SYSTOLIC BLOOD PRESSURE: 115 MMHG

## 2024-07-12 DIAGNOSIS — Z00.00 HEALTHCARE MAINTENANCE: ICD-10-CM

## 2024-07-12 DIAGNOSIS — R25.1 TREMOR: Primary | ICD-10-CM

## 2024-07-12 PROCEDURE — 3078F DIAST BP <80 MM HG: CPT | Performed by: PSYCHIATRY & NEUROLOGY

## 2024-07-12 PROCEDURE — 1159F MED LIST DOCD IN RCRD: CPT | Performed by: PSYCHIATRY & NEUROLOGY

## 2024-07-12 PROCEDURE — 1160F RVW MEDS BY RX/DR IN RCRD: CPT | Performed by: PSYCHIATRY & NEUROLOGY

## 2024-07-12 PROCEDURE — 3074F SYST BP LT 130 MM HG: CPT | Performed by: PSYCHIATRY & NEUROLOGY

## 2024-07-12 PROCEDURE — 99215 OFFICE O/P EST HI 40 MIN: CPT | Performed by: PSYCHIATRY & NEUROLOGY

## 2024-07-12 PROCEDURE — 93005 ELECTROCARDIOGRAM TRACING: CPT

## 2024-07-12 PROCEDURE — G2212 PROLONG OUTPT/OFFICE VIS: HCPCS | Performed by: PSYCHIATRY & NEUROLOGY

## 2024-07-12 RX ORDER — LEVETIRACETAM 250 MG/1
250 TABLET ORAL 2 TIMES DAILY
Qty: 180 TABLET | Refills: 1 | Status: SHIPPED | OUTPATIENT
Start: 2024-07-12 | End: 2025-01-08

## 2024-07-12 NOTE — PROGRESS NOTES
"Subjective     Mitali Gonzales is a 73 y.o. year old right handed female presenting as a new patient for limbs/head/voice tremor, status post aneurysmal subarachnoid hemorrhage and subsequent titanium mesh placement.    HPI    She has past medical history significant for hypertension, hyperlipidemia, hypothyroidism, remote former cigarette smoking.  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 Gipson.     She is a former neurological patient of Dr. Sotelo, movement disorders, and presents today as a new patient for tremor.  She is accompanied to the visit by her .    She and her  indicate that even though the subarachnoid hemorrhage was sustained in 2013, it was not until shortly after the placement of titanium mesh in 2017 that she manifested tremor.    The tremor is described in the movement disorders notes, which I reviewed today, as a rubral tremor predominantly involving the left upper extremity and to a lesser extent the right upper extremity.  The patient indicates bilateral upper extremity tremor that is somewhat more left-sided, but additionally complains of \"whole body\" tremor including head, limbs, torso.  She does not endorse lower extremity tremor interfering with gait/station.    She complains of tremor that is exacerbated by use of the upper extremities and by fatigue.  Tremor can interfere with activities such as use of utensils, drinking from a cup or glass, and other hand use.  She does not complain of a significant rest tremor component.    She drinks wine occasionally but it does not obviously help with the tremor.    She has been gradually titrated on primidone which she has been taking for a number of years, apparently up to 300 twice " daily but currently reported as reduced to 50 mg twice daily.  She does not feel that tremor control was better at the higher dose.  Keppra 500 mg twice daily was added to see whether this would help with tremor control but she has not appreciated any efficacy.  She expresses some interest in tapering down the dose of Keppra and of note, she is not a  and has no actual seizure history.    Whether related to her tremor medications or other factors, she endorses fatigue/drowsiness.    She has not to her knowledge ever been tried on a beta-blocker, does not indicate an obvious contraindication such as asthma, and I could not determine from review of her available notes on Epic whether this has ever been tried or considered.    She does recall a trial of gabapentin which was associated with allergy.  She has not to her knowledge ever been tried on topiramate.    She takes medical marijuana in gummy form which helps the tremor a bit, and she thinks it is actually more effective than primidone.    As far as nonpharmacologic tremor interventions she feels that acupuncture has been beneficial to a limited extent.  She tried [employer]ift[employer] which was ineffective.  Similarly, weighted utensils were ineffective.  She believes she tried light wrist weights at some point but cannot say that they provided much benefit.  PT/OT was not effective.    I reviewed neuroimaging.  Noncontrast head CT from 9/30/2023 shows a right frontal encephalomalacia cavity with adjacent right temporal encephalomalacia.  Right frontal craniotomy.  Brain MRI (T2 axial sequence done at the time of MRAs) from 10/3/2023 shows right frontal and temporal encephalomalacia as above.  No evident midbrain or thalamic lesion.  Surveillance MRA from 2/7/2024 shows stable postoperative changes.  Stable (compared to 2021) small unruptured left MCA aneurysm measuring approximately 2 x 2 x 1 mm.    Review of Systems    Review of Systems:  Neurologic:  As per the  history of present illness.  Constitutional:  Negative for fevers, chills, or weight loss, positive for fatigue.  Musculoskeletal:  Negative for neck pain or back pain. Cardiovascular:  Negative for chest pain or palpitations.  Respiratory:  Negative for dyspnea.  Eyes:  Negative for vision loss or diplopia.  ENT: Positive for left hearing loss, negative for tinnitus.  GI:  Negative for bowel incontinence.  :  Negative for bladder incontinence.  Dermatologic:  Negative for rash.        Patient Active Problem List   Diagnosis    Abnormal mammogram    Anxiety    Cough    Decreased range of motion of left shoulder    Dizziness    Elevated cholesterol with elevated triglycerides    Hand tingling    History of cranioplasty    HTN (hypertension)    Hypothyroidism    Impacted cerumen of both ears    Middle cerebral artery aneurysm (HHS-HCC)    Residual cognitive deficit as late effect of stroke    Rubral tremors    Tingling of left upper extremity    Tremor    Urinary incontinence    Weakness of left upper extremity     Past Medical History:   Diagnosis Date    Encounter for screening for malignant neoplasm of colon 09/14/2018    Colon cancer screening    Nontraumatic subarachnoid hemorrhage, unspecified ([facility]) 08/10/2017    Subarachnoid hemorrhage, nontraumatic    Other conditions influencing health status     Closed Fracture Of The Fifth Metatarsal    Personal history of other infectious and parasitic diseases 09/27/2019    History of wound infection    Unspecified open wound of scalp, initial encounter 09/14/2018    Scalp wound     Past Surgical History:   Procedure Laterality Date    CT HEAD ANGIO W AND WO IV CONTRAST  7/27/2013    CT HEAD ANGIO W AND WO IV CONTRAST 7/27/2013 [facility] CL[facility]ICAL LEGAC[facility]    CT HEAD ANGIO W AND WO IV CONTRAST  8/6/2013    CT HEAD ANGIO W AND WO IV CONTRAST 8/6/2013 [facility] CLINICAL LEGACY    CT HEAD ANGIO W AND WO IV CONTRAST  10/4/2017    CT HEAD ANGIO W AND WO IV CONTRAST 10/4/2017 [facility] [place] [facility]  INPATIENT LEGACY    MR HEAD ANGIO W AND WO IV CONTRAST  2014    MR HEAD ANGIO W AND WO IV CONTRAST [place] [facility] [place]    MR HEAD ANGIO W AND WO IV CONTRAST  10/7/2019    MR HEAD ANGIO W AND WO IV CONTRAST [place] [facility] [place]    MR HEAD ANGIO W AND WO IV CONTRAST  10/3/2023    MR HEAD ANGIO W AND WO IV CONTRAST [place]2023 [facility] [place]    MR HEAD ANGIO WO IV CONTRAST  8/10/2015    MR HEAD ANGIO WO IV CONTRAST [place]10/2015 [facility] [place] [place]    OTHER SURGICAL HISTORY  2013    Craniotomy (Therapeutic)     Social History     Tobacco Use    Smoking status: Former     Current packs/day: 0.00     Types: Cigarettes     Quit date:      Years since quittin.5    Smokeless tobacco: Never   Substance Use Topics    Alcohol use: Not Currently     family history is not on file.    Current Outpatient Medications:     atorvastatin (Lipitor) 20 mg tablet, Take 1 tablet (20 mg) by mouth once daily at bedtime., Disp: 90 tablet, Rfl: 3    busPIRone (Buspar) 10 mg tablet, Take 1 tablet (10 mg) by mouth 2 times a day., Disp: 180 tablet, Rfl: 3    clindamycin (Clindagel) 1 % gel, USE TO SCALP DAILY, Disp: , Rfl:     coenzyme Q-10 200 mg capsule, Take 1 capsule (200 mg) by mouth once daily., Disp: 90 capsule, Rfl: 3    icosapent ethyL (Vascepa) 1 gram capsule, Take 1 capsule (1 g) by mouth 2 times a day with meals., Disp: 180 capsule, Rfl: 3    levETIRAcetam (Keppra) 500 mg tablet, Take 1 tablet (500 mg) by mouth 2 times a day. AS DIRECTED, Disp: 180 tablet, Rfl: 0    levothyroxine (Synthroid, Levoxyl) 88 mcg tablet, Take 1 tablet (88 mcg) by mouth once daily., Disp: 90 tablet, Rfl: 3    primidone (Mysoline) 250 mg tablet, Take 1 tablet (250 mg) by mouth 2 times a day., Disp: 60 tablet, Rfl: 1    primidone (Mysoline) 50 mg tablet, TAKE 2 TABLETS BY MOUTH AT 12PM, Disp: , Rfl:     rosuvastatin (Crestor) 10 mg tablet, Take 1 tablet (10 mg) by mouth once daily., Disp: 100 tablet, Rfl: 3     triamterene-hydrochlorothiazid (Dyazide) 37.5-25 mg capsule, Take 1 capsule by mouth once daily., Disp: 90 capsule, Rfl: 3  No Known Allergies    Objective   Neurological Exam  Physical Exam    Physical Examination:    General: Alert woman who was ambulatory without assistive devices.      Mental Status: Clear sensorium without fluctuation.  Appropriate and oriented in conversation.  Recent and remote recall intact for details of medical history.  Attention and concentration intact during interview.  Fund of knowledge fair for medical information.  Language intact and fluent without paraphasic errors.    Cranial Nerves:  Funduscopic exam revealed sharp temporal disc margins bilaterally.  Pupils were equal, round and reactive to light with no relative afferent pupillary defect.  Extraocular movements were intact and conjugate without nystagmus.  No ptosis.  Binocular confrontation visual field testing revealed a left inferior quadrantic defect to stationary fingers, which the patient reported as baseline status post aneurysm rupture.  Facial sensation was symmetric to pin.  Facial motor function was symmetrically intact.  Hearing was grossly intact.  No dysarthria.  Shoulder shrug was symmetric.  Tongue protrusion was midline.    Motor: Muscle bulk and tone were normal throughout. There was no pronator drift or asymmetry of finger taps. Confrontation strength was symmetrically 5/5 throughout the upper and lower extremities.     Coordination: Mild-moderate postural tremor of left more than right hand, and a similar degree of tremor with action (finger to finger maneuver).  Frequently noted low amplitude rapid head and perioral tremor.  Mild voice tremor.  No rest tremor of the upper extremities.  Intact heel-to-shin.  No myoclonus or dystonic posturing.  Symmetric alternating and repetitive hand movements.    Tendon Reflexes: 2+ right biceps and brachioradialis, trace right triceps, absent throughout the left upper  extremity, absent right patellar, 1+ left patellar, absent ankles, neutral plantars.    Sensation: Pin and light touch were symmetric over the hands.  Vibration thresholds were normal at the index fingers.  Romberg sign was absent.    Station: Intact and stable.    Gait: Stable and unremarkable.  Intact tandem.      Assessment/Plan     This woman gives history of tremor involving the left more than right upper extremity, head and voice since shortly after titanium mesh placement in 2017.    Her tremor has not obviously responded to primidone after aggressive titration and she indicates currently taking a lower dose of 50 mg twice daily.  She does not feel that the addition of Keppra 500 mg twice daily has conferred benefit, and it may be contributing to drowsiness as I discussed.  She has not to her knowledge been trialed on a beta-blocker.  With regard to second-line tremor agents she reports an adverse reaction to gabapentin and has apparently not been tried yet on topiramate.    I recommended a trial of propranolol.  I did not find concerning interactions with her current medication regimen on review on Micromedex.  I did recommend a baseline EKG first and this will have to be reviewed prior to starting propranolol.  Given relatively low blood pressure at baseline I would favor a low starting dose of 10 mg twice daily if the EKG is acceptable.  I reviewed side effect profile of beta-blockers with the patient and her .    I recommended reducing the dose of Keppra to 250 mg twice daily.  As above, she is a nondriver and does not have an established seizure history; Keppra was prescribed apparently strictly for tremor control.    For the time being I recommended she continue the present dose of primidone.    If she has a favorable response to propranolol I would consider further tapering of primidone and Keppra.    If she is intolerant of propranolol or fails to note improvement after appropriate titration  then I would consider a trial of topiramate.    She will continue following with neurosurgery for periodic aneurysm surveillance.    We will determine timing of her next office visit after determining about propranolol initiation.

## 2024-07-12 NOTE — PATIENT INSTRUCTIONS
We discussed that your tremor has not responded appreciably to a high dose of primidone nor to the addition of levetiracetam.  Gabapentin was tried years ago with intolerance.    We discussed some other options including propranolol and topiramate.  I recommend a trial of propranolol.  However, as it is a cardiac medication I recommend checking an EKG first.  I will provide you an order for this test.  Once the EKG is resulted and assuming it is acceptable we will try a low dose of propranolol.  I reviewed side effect profile.    As you feel fatigued and levetiracetam may be contributing to this, I recommend reducing the levetiracetam dose to 250 mg twice daily.  I will send a refill for this lower dose to your pharmacy.    We will determine timing of your next office visit after your EKG is completed and we have decided about a propranolol trial.

## 2024-07-14 LAB
ATRIAL RATE: 63 BPM
P AXIS: 5 DEGREES
P OFFSET: 173 MS
P ONSET: 124 MS
PR INTERVAL: 204 MS
Q ONSET: 226 MS
QRS COUNT: 10 BEATS
QRS DURATION: 84 MS
QT INTERVAL: 414 MS
QTC CALCULATION(BAZETT): 423 MS
QTC FREDERICIA: 420 MS
R AXIS: 2 DEGREES
T AXIS: 43 DEGREES
T OFFSET: 433 MS
VENTRICULAR RATE: 63 BPM

## 2024-07-16 DIAGNOSIS — R25.1 TREMOR: Primary | ICD-10-CM

## 2024-07-16 RX ORDER — PROPRANOLOL HYDROCHLORIDE 10 MG/1
10 TABLET ORAL 2 TIMES DAILY
Qty: 60 TABLET | Refills: 1 | Status: SHIPPED | OUTPATIENT
Start: 2024-07-16

## 2024-08-06 DIAGNOSIS — R25.1 TREMOR, UNSPECIFIED: ICD-10-CM

## 2024-08-06 RX ORDER — PRIMIDONE 250 MG/1
250 TABLET ORAL 2 TIMES DAILY
Qty: 180 TABLET | Refills: 2 | Status: SHIPPED | OUTPATIENT
Start: 2024-08-06

## 2024-08-07 DIAGNOSIS — R25.1 TREMOR: ICD-10-CM

## 2024-08-07 RX ORDER — PROPRANOLOL HYDROCHLORIDE 10 MG/1
10 TABLET ORAL 2 TIMES DAILY
Qty: 180 TABLET | Refills: 1 | Status: SHIPPED | OUTPATIENT
Start: 2024-08-07

## 2024-08-21 PROBLEM — L08.9 LOCAL INFECTION OF WOUND: Status: ACTIVE | Noted: 2024-08-21

## 2024-08-21 PROBLEM — C44.319 BASAL CELL CARCINOMA OF SKIN OF OTHER PARTS OF FACE: Status: ACTIVE | Noted: 2021-08-04

## 2024-08-21 PROBLEM — J44.1 ACUTE EXACERBATION OF CHRONIC OBSTRUCTIVE PULMONARY DISEASE (MULTI): Status: ACTIVE | Noted: 2024-08-21

## 2024-08-21 PROBLEM — T14.8XXA LOCAL INFECTION OF WOUND: Status: ACTIVE | Noted: 2024-08-21

## 2024-08-21 PROBLEM — T78.40XA ALLERGIC REACTION: Status: ACTIVE | Noted: 2024-08-21

## 2024-08-21 PROBLEM — R21 RASH AND OTHER NONSPECIFIC SKIN ERUPTION: Status: ACTIVE | Noted: 2018-06-26

## 2024-08-21 PROBLEM — S63.509A SPRAIN OF WRIST: Status: ACTIVE | Noted: 2024-08-21

## 2024-08-21 PROBLEM — S63.502A SPRAIN OF LEFT WRIST: Status: ACTIVE | Noted: 2024-08-21

## 2024-08-21 PROBLEM — R09.02 HYPOXIA: Status: ACTIVE | Noted: 2024-08-21

## 2024-08-21 PROBLEM — H61.20 IMPACTED CERUMEN: Status: ACTIVE | Noted: 2024-08-21

## 2024-08-22 ENCOUNTER — LAB (OUTPATIENT)
Dept: LAB | Facility: LAB | Age: 74
End: 2024-08-22
Payer: MEDICARE

## 2024-08-22 DIAGNOSIS — I67.1 MIDDLE CEREBRAL ARTERY ANEURYSM (HHS-HCC): ICD-10-CM

## 2024-08-22 DIAGNOSIS — E03.9 HYPOTHYROIDISM, UNSPECIFIED TYPE: ICD-10-CM

## 2024-08-22 DIAGNOSIS — R32 URINARY INCONTINENCE, UNSPECIFIED TYPE: ICD-10-CM

## 2024-08-22 DIAGNOSIS — E78.2 ELEVATED CHOLESTEROL WITH ELEVATED TRIGLYCERIDES: ICD-10-CM

## 2024-08-22 LAB
ALBUMIN SERPL BCP-MCNC: 4 G/DL (ref 3.4–5)
ALP SERPL-CCNC: 65 U/L (ref 33–136)
ALT SERPL W P-5'-P-CCNC: 19 U/L (ref 7–45)
ANION GAP SERPL CALC-SCNC: 11 MMOL/L (ref 10–20)
APPEARANCE UR: ABNORMAL
AST SERPL W P-5'-P-CCNC: 14 U/L (ref 9–39)
BASOPHILS # BLD AUTO: 0.02 X10*3/UL (ref 0–0.1)
BASOPHILS NFR BLD AUTO: 0.3 %
BILIRUB SERPL-MCNC: 0.4 MG/DL (ref 0–1.2)
BILIRUB UR STRIP.AUTO-MCNC: NEGATIVE MG/DL
BUN SERPL-MCNC: 11 MG/DL (ref 6–23)
CALCIUM SERPL-MCNC: 9.5 MG/DL (ref 8.6–10.6)
CAOX CRY #/AREA UR COMP ASSIST: ABNORMAL /HPF
CHLORIDE SERPL-SCNC: 105 MMOL/L (ref 98–107)
CHOLEST SERPL-MCNC: 171 MG/DL (ref 0–199)
CHOLESTEROL/HDL RATIO: 3.8
CO2 SERPL-SCNC: 30 MMOL/L (ref 21–32)
COLOR UR: ABNORMAL
CREAT SERPL-MCNC: 0.65 MG/DL (ref 0.5–1.05)
EGFRCR SERPLBLD CKD-EPI 2021: >90 ML/MIN/1.73M*2
EOSINOPHIL # BLD AUTO: 0.08 X10*3/UL (ref 0–0.4)
EOSINOPHIL NFR BLD AUTO: 1.3 %
ERYTHROCYTE [DISTWIDTH] IN BLOOD BY AUTOMATED COUNT: 13.6 % (ref 11.5–14.5)
GLUCOSE SERPL-MCNC: 97 MG/DL (ref 74–99)
GLUCOSE UR STRIP.AUTO-MCNC: NORMAL MG/DL
HCT VFR BLD AUTO: 44.4 % (ref 36–46)
HDLC SERPL-MCNC: 45.2 MG/DL
HGB BLD-MCNC: 14.7 G/DL (ref 12–16)
HYALINE CASTS #/AREA URNS AUTO: ABNORMAL /LPF
IMM GRANULOCYTES # BLD AUTO: 0.01 X10*3/UL (ref 0–0.5)
IMM GRANULOCYTES NFR BLD AUTO: 0.2 % (ref 0–0.9)
KETONES UR STRIP.AUTO-MCNC: NEGATIVE MG/DL
LDLC SERPL CALC-MCNC: 75 MG/DL
LEUKOCYTE ESTERASE UR QL STRIP.AUTO: ABNORMAL
LYMPHOCYTES # BLD AUTO: 1.61 X10*3/UL (ref 0.8–3)
LYMPHOCYTES NFR BLD AUTO: 26.4 %
MCH RBC QN AUTO: 31.9 PG (ref 26–34)
MCHC RBC AUTO-ENTMCNC: 33.1 G/DL (ref 32–36)
MCV RBC AUTO: 96 FL (ref 80–100)
MONOCYTES # BLD AUTO: 0.4 X10*3/UL (ref 0.05–0.8)
MONOCYTES NFR BLD AUTO: 6.5 %
MUCOUS THREADS #/AREA URNS AUTO: ABNORMAL /LPF
NEUTROPHILS # BLD AUTO: 3.99 X10*3/UL (ref 1.6–5.5)
NEUTROPHILS NFR BLD AUTO: 65.3 %
NITRITE UR QL STRIP.AUTO: NEGATIVE
NON HDL CHOLESTEROL: 126 MG/DL (ref 0–149)
NRBC BLD-RTO: 0 /100 WBCS (ref 0–0)
PH UR STRIP.AUTO: 6.5 [PH]
PLATELET # BLD AUTO: 325 X10*3/UL (ref 150–450)
POTASSIUM SERPL-SCNC: 4.2 MMOL/L (ref 3.5–5.3)
PROT SERPL-MCNC: 6.5 G/DL (ref 6.4–8.2)
PROT UR STRIP.AUTO-MCNC: NEGATIVE MG/DL
RBC # BLD AUTO: 4.61 X10*6/UL (ref 4–5.2)
RBC # UR STRIP.AUTO: NEGATIVE /UL
RBC #/AREA URNS AUTO: ABNORMAL /HPF
SODIUM SERPL-SCNC: 142 MMOL/L (ref 136–145)
SP GR UR STRIP.AUTO: 1.02
SQUAMOUS #/AREA URNS AUTO: ABNORMAL /HPF
T4 FREE SERPL-MCNC: 1.04 NG/DL (ref 0.78–1.48)
TRIGL SERPL-MCNC: 252 MG/DL (ref 0–149)
TSH SERPL-ACNC: 15.48 MIU/L (ref 0.44–3.98)
UROBILINOGEN UR STRIP.AUTO-MCNC: NORMAL MG/DL
VLDL: 50 MG/DL (ref 0–40)
WBC # BLD AUTO: 6.1 X10*3/UL (ref 4.4–11.3)
WBC #/AREA URNS AUTO: ABNORMAL /HPF

## 2024-08-22 PROCEDURE — 81001 URINALYSIS AUTO W/SCOPE: CPT

## 2024-08-22 PROCEDURE — 84439 ASSAY OF FREE THYROXINE: CPT

## 2024-08-22 PROCEDURE — 85025 COMPLETE CBC W/AUTO DIFF WBC: CPT

## 2024-08-22 PROCEDURE — 80061 LIPID PANEL: CPT

## 2024-08-22 PROCEDURE — 80053 COMPREHEN METABOLIC PANEL: CPT

## 2024-08-22 PROCEDURE — 84443 ASSAY THYROID STIM HORMONE: CPT

## 2024-08-22 PROCEDURE — 36415 COLL VENOUS BLD VENIPUNCTURE: CPT

## 2024-08-27 ENCOUNTER — APPOINTMENT (OUTPATIENT)
Dept: PRIMARY CARE | Facility: CLINIC | Age: 74
End: 2024-08-27
Payer: MEDICARE

## 2024-08-27 VITALS
HEIGHT: 60 IN | OXYGEN SATURATION: 96 % | HEART RATE: 73 BPM | BODY MASS INDEX: 28.94 KG/M2 | WEIGHT: 147.4 LBS | RESPIRATION RATE: 18 BRPM

## 2024-08-27 DIAGNOSIS — E78.2 ELEVATED CHOLESTEROL WITH ELEVATED TRIGLYCERIDES: ICD-10-CM

## 2024-08-27 DIAGNOSIS — R32 URINARY INCONTINENCE, UNSPECIFIED TYPE: ICD-10-CM

## 2024-08-27 DIAGNOSIS — Z00.00 MEDICARE ANNUAL WELLNESS VISIT, SUBSEQUENT: Primary | ICD-10-CM

## 2024-08-27 DIAGNOSIS — I10 PRIMARY HYPERTENSION: ICD-10-CM

## 2024-08-27 DIAGNOSIS — E03.9 HYPOTHYROIDISM, UNSPECIFIED TYPE: ICD-10-CM

## 2024-08-27 DIAGNOSIS — I67.1 MIDDLE CEREBRAL ARTERY ANEURYSM (HHS-HCC): ICD-10-CM

## 2024-08-27 PROCEDURE — 1159F MED LIST DOCD IN RCRD: CPT | Performed by: INTERNAL MEDICINE

## 2024-08-27 PROCEDURE — 1170F FXNL STATUS ASSESSED: CPT | Performed by: INTERNAL MEDICINE

## 2024-08-27 PROCEDURE — G0439 PPPS, SUBSEQ VISIT: HCPCS | Performed by: INTERNAL MEDICINE

## 2024-08-27 RX ORDER — TRIAMTERENE AND HYDROCHLOROTHIAZIDE 37.5; 25 MG/1; MG/1
1 CAPSULE ORAL DAILY
Qty: 90 CAPSULE | Refills: 3 | Status: SHIPPED | OUTPATIENT
Start: 2024-09-11 | End: 2025-09-11

## 2024-08-27 RX ORDER — LEVOTHYROXINE SODIUM 88 UG/1
88 TABLET ORAL DAILY
Qty: 90 TABLET | Refills: 3 | Status: SHIPPED | OUTPATIENT
Start: 2024-08-27 | End: 2024-08-27 | Stop reason: DRUGHIGH

## 2024-08-27 RX ORDER — DOXYCYCLINE 50 MG/1
TABLET ORAL
COMMUNITY
Start: 2024-07-30

## 2024-08-27 RX ORDER — KETOCONAZOLE 20 MG/ML
SHAMPOO, SUSPENSION TOPICAL
COMMUNITY

## 2024-08-27 RX ORDER — LEVOTHYROXINE SODIUM 100 UG/1
100 TABLET ORAL DAILY
Qty: 90 TABLET | Refills: 3 | Status: SHIPPED | OUTPATIENT
Start: 2024-08-27 | End: 2025-08-27

## 2024-08-27 RX ORDER — OMEGA-3-ACID ETHYL ESTERS 1 G/1
CAPSULE, LIQUID FILLED ORAL
COMMUNITY

## 2024-08-27 ASSESSMENT — ACTIVITIES OF DAILY LIVING (ADL)
FEEDING YOURSELF: INDEPENDENT
BATHING: INDEPENDENT
DRESSING YOURSELF: INDEPENDENT
JUDGMENT_ADEQUATE_SAFELY_COMPLETE_DAILY_ACTIVITIES: YES
TOILETING: INDEPENDENT
GROOMING: INDEPENDENT
WALKS IN HOME: INDEPENDENT
HEARING - LEFT EAR: DIFFICULTY WITH NOISE
ADEQUATE_TO_COMPLETE_ADL: YES
HEARING - RIGHT EAR: FUNCTIONAL
PATIENT'S MEMORY ADEQUATE TO SAFELY COMPLETE DAILY ACTIVITIES?: YES

## 2024-08-27 ASSESSMENT — ENCOUNTER SYMPTOMS
LOSS OF SENSATION IN FEET: 0
OCCASIONAL FEELINGS OF UNSTEADINESS: 0
DEPRESSION: 0

## 2024-08-27 ASSESSMENT — PATIENT HEALTH QUESTIONNAIRE - PHQ9
2. FEELING DOWN, DEPRESSED OR HOPELESS: NOT AT ALL
1. LITTLE INTEREST OR PLEASURE IN DOING THINGS: NOT AT ALL
SUM OF ALL RESPONSES TO PHQ9 QUESTIONS 1 AND 2: 0

## 2024-08-27 NOTE — PROGRESS NOTES
"Subjective   Reason for Visit: Mitali Gonzales is an 74 y.o. female here for a Medicare Wellness visit.      CPE/AWV    MCA Aneurysm    Hypothroid    Reviewed all medications by prescribing practitioner or clinical pharmacist (such as prescriptions, OTCs, herbal therapies and supplements) and documented in the medical record.    HPI    Patient Care Team:  Gatito Rene MD as PCP - General  Gatito Rene MD as PCP - [facility] ACO Attributed Provider     Review of Systems      Constitutional: Negative for activity change, appetite change, chills, fatigue and fever.   HENT: Negative for congestion, ear pain, rhinorrhea, sinus pain and sore throat.   Eyes: Negative for pain, discharge and redness.   Respiratory: Negative for cough, shortness of breath and wheezing.   Cardiovascular: Negative for chest pain.   Gastrointestinal: Negative for abdominal pain.   Skin: Negative for rash.      Objective   Vitals:  Pulse 73   Resp 18   Ht 1.531 m (5' 0.28\")   Wt 66.9 kg (147 lb 6.4 oz)   LMP  (LMP Unknown)   SpO2 96%   BMI 28.52 kg/m²       Physical Exam    Assessment/Plan   Problem List Items Addressed This Visit             ICD-10-CM    Elevated cholesterol with elevated triglycerides E78.2    Relevant Orders    Lipid Panel    HTN (hypertension) I10    Relevant Medications    triamterene-hydrochlorothiazid (Dyazide) 37.5-25 mg capsule (Start on 9/11/2024)    Hypothyroidism E03.9    Relevant Medications    levothyroxine (Synthroid, Levoxyl) 100 mcg tablet    Other Relevant Orders    TSH with reflex to Free T4 if abnormal (Completed)    TSH with reflex to Free T4 if abnormal    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

## 2024-09-04 ASSESSMENT — ACTIVITIES OF DAILY LIVING (ADL)
GROCERY_SHOPPING: NEEDS ASSISTANCE
DOING_HOUSEWORK: NEEDS ASSISTANCE
DRESSING: INDEPENDENT
TAKING_MEDICATION: NEEDS ASSISTANCE
MANAGING_FINANCES: NEEDS ASSISTANCE
BATHING: INDEPENDENT

## 2024-09-04 ASSESSMENT — PATIENT HEALTH QUESTIONNAIRE - PHQ9
SUM OF ALL RESPONSES TO PHQ9 QUESTIONS 1 AND 2: 0
2. FEELING DOWN, DEPRESSED OR HOPELESS: NOT AT ALL
1. LITTLE INTEREST OR PLEASURE IN DOING THINGS: NOT AT ALL

## 2024-10-05 ENCOUNTER — OFFICE VISIT (OUTPATIENT)
Dept: URGENT CARE | Age: 74
End: 2024-10-05
Payer: MEDICARE

## 2024-10-05 VITALS
HEART RATE: 64 BPM | DIASTOLIC BLOOD PRESSURE: 73 MMHG | BODY MASS INDEX: 28.45 KG/M2 | OXYGEN SATURATION: 97 % | SYSTOLIC BLOOD PRESSURE: 116 MMHG | WEIGHT: 147 LBS | RESPIRATION RATE: 16 BRPM

## 2024-10-05 DIAGNOSIS — J30.9 ALLERGIC RHINITIS, UNSPECIFIED SEASONALITY, UNSPECIFIED TRIGGER: ICD-10-CM

## 2024-10-05 DIAGNOSIS — R09.81 NASAL CONGESTION: Primary | ICD-10-CM

## 2024-10-05 ASSESSMENT — ENCOUNTER SYMPTOMS
FEVER: 0
SINUS PRESSURE: 0
COUGH: 1
RHINORRHEA: 0
WHEEZING: 0
SINUS COMPLAINT: 1
CHILLS: 0
SINUS PAIN: 0
SHORTNESS OF BREATH: 0
CHEST TIGHTNESS: 0
SORE THROAT: 1

## 2024-10-05 NOTE — PROGRESS NOTES
"Subjective   Patient ID: Mitali Gonzales \"Behzad" is a 74 y.o. female. They present today with a chief complaint of Sinus Problem (Pt c/o sinus drainage with cough and congestion x5 days, +relief with Flonase at home).    History of Present Illness  Patient presents today complaining of nasal congestion, post nasal drainage that is triggering a cough and a slightly scratchy throat at times period she states she does not feel sick and denies any fevers, chills, shortness of breath, chest tightness the wheezing kind of headaches or ear pain. She states she tried some Flonase that she had at home and this relieved her symptoms.      Sinus Problem  Associated symptoms: congestion, cough and sore throat (scratchy throat)    Associated symptoms: no chest pain, no ear pain, no fever, no rhinorrhea, no shortness of breath and no wheezing        Past Medical History  Allergies as of 10/05/2024    (No Known Allergies)       (Not in a hospital admission)       Past Medical History:   Diagnosis Date    Encounter for screening for malignant neoplasm of colon 09/14/2018    Colon cancer screening    Nontraumatic subarachnoid hemorrhage, unspecified (Multi) 08/10/2017    Subarachnoid hemorrhage, nontraumatic    Other conditions influencing health status     Closed Fracture Of The Fifth Metatarsal    Personal history of other infectious and parasitic diseases 09/27/2019    History of wound infection    Unspecified open wound of scalp, initial encounter 09/14/2018    Scalp wound       Past Surgical History:   Procedure Laterality Date    CT HEAD ANGIO W AND WO IV CONTRAST  7/27/2013    CT HEAD ANGIO W AND WO IV CONTRAST 7/27/2013 [facility] CLINICAL LEGACY    CT HEAD ANGIO W AND WO IV CONTRAST  8/6/2013    CT HEAD ANGIO W AND WO IV CONTRAST 8/6/2013 [facility] CLINICAL LEGACY    CT HEAD ANGIO W AND WO IV CONTRAST  10/4/2017    [place] HEAD ANGIO W AND [place] [facility] [place]    MR HEAD ANGIO W AND WO IV CONTRAST  8/2/2014    " MR HEAD ANGIO W AND WO IV CONTRAST 8/2/2014 [facility] ANCILLARY LEGACY    MR HEAD ANGIO W AND WO IV CONTRAST  10/7/2019    MR HEAD ANGIO W AND WO IV CONTRAST 10/7/2019 [facility] ANCILLARY LEGACY    MR HEAD ANGIO W AND WO IV CONTRAST  10/3/2023    MR HEAD ANGIO W AND WO IV CONTRAST 10/3/2023 [facility] MRI    MR HEAD ANGIO WO IV CONTRAST  8/10/2015    MR HEAD ANGIO WO IV CONTRAST 8/10/2015 [facility] ANCILLARY LEGACY    OTHER SURGICAL HISTORY  09/20/2013    Craniotomy (Therapeutic)        reports that she quit smoking about 11 years ago. Her smoking use included cigarettes. She has never used smokeless tobacco. She reports that she does not currently use alcohol. She reports current drug use. Drug: Marijuana.    Review of Systems  Review of Systems   Constitutional:  Negative for chills and fever.   HENT:  Positive for congestion, postnasal drip and sore throat (scratchy throat). Negative for ear discharge, ear pain, rhinorrhea, sinus pressure, sinus pain and sneezing.    Respiratory:  Positive for cough. Negative for chest tightness, shortness of breath and wheezing.    Cardiovascular:  Negative for chest pain and leg swelling.                                  Objective    Vitals:    10/05/24 1320   BP: 116/73   Pulse: 64   Resp: 16   SpO2: 97%   Weight: 66.7 kg (147 lb)     No LMP recorded (lmp unknown). Patient is postmenopausal.    Physical Exam  Vitals and nursing note reviewed.   Constitutional:       General: She is not in acute distress.     Appearance: Normal appearance. She is not ill-appearing.   HENT:      Right Ear: Tympanic membrane, ear canal and external ear normal.      Left Ear: Tympanic membrane, ear canal and external ear normal.      Nose: Congestion present.      Right Turbinates: Enlarged and pale.      Left Turbinates: Enlarged and pale.      Mouth/Throat:      Pharynx: Postnasal drip present. No pharyngeal swelling, oropharyngeal exudate or posterior oropharyngeal erythema.      Tonsils: No tonsillar exudate or  tonsillar abscesses.   Eyes:      General:         Right eye: No discharge.         Left eye: No discharge.      Conjunctiva/sclera: Conjunctivae normal.   Cardiovascular:      Rate and Rhythm: Normal rate and regular rhythm.      Pulses: Normal pulses.      Heart sounds: Normal heart sounds.   Pulmonary:      Effort: Pulmonary effort is normal. No respiratory distress.      Breath sounds: Normal breath sounds. No wheezing, rhonchi or rales.   Lymphadenopathy:      Cervical: No cervical adenopathy.   Neurological:      Mental Status: She is alert and oriented to person, place, and time.         Procedures    Point of Care Test & Imaging Results from this visit  No results found for this visit on 10/05/24.   No results found.    Diagnostic study results (if any) were reviewed by Shivani Hernandez PA-C.    Assessment/Plan   Allergies, medications, history, and pertinent labs/EKGs/Imaging reviewed by Shivani Hernandez PA-C.     Medical Decision Making  Patient presents today with congestion and postnasal drainage, though reports not feeling sick or fevers/chills. She appears on physical exam to have congestion likely secondary to seasonal allergies. I did recommend to continue with Flonase as this has been helpful and that she can try a non sedating antihistamine like Zyrtec or Claritin.  She states she has taken Zyrtec before without any side effects. no signs of pneumonia, sinusitis, otitis or other bacterial etiology. Advised to go to ER if worsens, otherwise follow with pcp. Patient verbalized understanding and agrees with plan.       Orders and Diagnoses  There are no diagnoses linked to this encounter.    Medical Admin Record      Patient disposition: Home    Electronically signed by Shivani Hernandez PA-C  1:33 PM

## 2024-11-21 ENCOUNTER — LAB (OUTPATIENT)
Dept: LAB | Facility: LAB | Age: 74
End: 2024-11-21
Payer: MEDICARE

## 2024-11-21 DIAGNOSIS — E03.9 HYPOTHYROIDISM, UNSPECIFIED TYPE: ICD-10-CM

## 2024-11-21 LAB — TSH SERPL-ACNC: 1.64 MIU/L (ref 0.44–3.98)

## 2024-11-21 PROCEDURE — 36415 COLL VENOUS BLD VENIPUNCTURE: CPT

## 2024-11-21 PROCEDURE — 84443 ASSAY THYROID STIM HORMONE: CPT

## 2025-01-22 DIAGNOSIS — E03.9 HYPOTHYROIDISM, UNSPECIFIED TYPE: ICD-10-CM

## 2025-01-22 RX ORDER — LEVOTHYROXINE SODIUM 100 UG/1
100 TABLET ORAL DAILY
Qty: 90 TABLET | Refills: 3 | Status: SHIPPED | OUTPATIENT
Start: 2025-01-22 | End: 2026-01-22

## 2025-01-26 ENCOUNTER — APPOINTMENT (OUTPATIENT)
Dept: RADIOLOGY | Facility: HOSPITAL | Age: 75
End: 2025-01-26
Payer: MEDICARE

## 2025-01-26 ENCOUNTER — HOSPITAL ENCOUNTER (EMERGENCY)
Facility: HOSPITAL | Age: 75
Discharge: HOME | End: 2025-01-27
Attending: EMERGENCY MEDICINE
Payer: MEDICARE

## 2025-01-26 VITALS
HEART RATE: 93 BPM | DIASTOLIC BLOOD PRESSURE: 82 MMHG | OXYGEN SATURATION: 95 % | RESPIRATION RATE: 16 BRPM | HEIGHT: 62 IN | WEIGHT: 149 LBS | SYSTOLIC BLOOD PRESSURE: 135 MMHG | TEMPERATURE: 98.1 F | BODY MASS INDEX: 27.42 KG/M2

## 2025-01-26 DIAGNOSIS — E03.9 HYPOTHYROIDISM, UNSPECIFIED TYPE: ICD-10-CM

## 2025-01-26 DIAGNOSIS — S82.101A CLOSED FRACTURE OF PROXIMAL END OF RIGHT TIBIA, UNSPECIFIED FRACTURE MORPHOLOGY, INITIAL ENCOUNTER: Primary | ICD-10-CM

## 2025-01-26 PROCEDURE — 73552 X-RAY EXAM OF FEMUR 2/>: CPT | Mod: RIGHT SIDE | Performed by: STUDENT IN AN ORGANIZED HEALTH CARE EDUCATION/TRAINING PROGRAM

## 2025-01-26 PROCEDURE — 73590 X-RAY EXAM OF LOWER LEG: CPT | Mod: RIGHT SIDE | Performed by: STUDENT IN AN ORGANIZED HEALTH CARE EDUCATION/TRAINING PROGRAM

## 2025-01-26 PROCEDURE — 2500000001 HC RX 250 WO HCPCS SELF ADMINISTERED DRUGS (ALT 637 FOR MEDICARE OP): Performed by: EMERGENCY MEDICINE

## 2025-01-26 PROCEDURE — 73700 CT LOWER EXTREMITY W/O DYE: CPT | Mod: RIGHT SIDE | Performed by: STUDENT IN AN ORGANIZED HEALTH CARE EDUCATION/TRAINING PROGRAM

## 2025-01-26 PROCEDURE — 99284 EMERGENCY DEPT VISIT MOD MDM: CPT | Performed by: EMERGENCY MEDICINE

## 2025-01-26 PROCEDURE — 73552 X-RAY EXAM OF FEMUR 2/>: CPT | Mod: RT

## 2025-01-26 PROCEDURE — 73700 CT LOWER EXTREMITY W/O DYE: CPT | Mod: RT

## 2025-01-26 PROCEDURE — 73590 X-RAY EXAM OF LOWER LEG: CPT | Mod: RT

## 2025-01-26 PROCEDURE — 73564 X-RAY EXAM KNEE 4 OR MORE: CPT | Mod: RIGHT SIDE | Performed by: STUDENT IN AN ORGANIZED HEALTH CARE EDUCATION/TRAINING PROGRAM

## 2025-01-26 PROCEDURE — 73564 X-RAY EXAM KNEE 4 OR MORE: CPT | Mod: RT

## 2025-01-26 RX ORDER — OXYCODONE AND ACETAMINOPHEN 5; 325 MG/1; MG/1
1 TABLET ORAL ONCE
Status: COMPLETED | OUTPATIENT
Start: 2025-01-26 | End: 2025-01-26

## 2025-01-26 RX ORDER — OXYCODONE AND ACETAMINOPHEN 5; 325 MG/1; MG/1
1 TABLET ORAL EVERY 8 HOURS PRN
Qty: 12 TABLET | Refills: 0 | Status: SHIPPED | OUTPATIENT
Start: 2025-01-26 | End: 2025-01-30

## 2025-01-26 RX ADMIN — OXYCODONE HYDROCHLORIDE AND ACETAMINOPHEN 1 TABLET: 5; 325 TABLET ORAL at 23:46

## 2025-01-26 RX ADMIN — OXYCODONE HYDROCHLORIDE AND ACETAMINOPHEN 1 TABLET: 5; 325 TABLET ORAL at 21:19

## 2025-01-26 ASSESSMENT — PAIN DESCRIPTION - ORIENTATION: ORIENTATION: RIGHT

## 2025-01-26 ASSESSMENT — COLUMBIA-SUICIDE SEVERITY RATING SCALE - C-SSRS
2. HAVE YOU ACTUALLY HAD ANY THOUGHTS OF KILLING YOURSELF?: NO
1. IN THE PAST MONTH, HAVE YOU WISHED YOU WERE DEAD OR WISHED YOU COULD GO TO SLEEP AND NOT WAKE UP?: NO
6. HAVE YOU EVER DONE ANYTHING, STARTED TO DO ANYTHING, OR PREPARED TO DO ANYTHING TO END YOUR LIFE?: NO

## 2025-01-26 ASSESSMENT — PAIN SCALES - GENERAL
PAINLEVEL_OUTOF10: 8
PAINLEVEL_OUTOF10: 6
PAINLEVEL_OUTOF10: 0 - NO PAIN

## 2025-01-26 ASSESSMENT — PAIN DESCRIPTION - LOCATION: LOCATION: LEG

## 2025-01-26 ASSESSMENT — PAIN - FUNCTIONAL ASSESSMENT: PAIN_FUNCTIONAL_ASSESSMENT: 0-10

## 2025-01-27 DIAGNOSIS — S82.101A CLOSED FRACTURE OF PROXIMAL END OF RIGHT TIBIA, UNSPECIFIED FRACTURE MORPHOLOGY, INITIAL ENCOUNTER: ICD-10-CM

## 2025-01-27 RX ORDER — LEVOTHYROXINE SODIUM 100 UG/1
100 TABLET ORAL DAILY
Qty: 90 TABLET | Refills: 3 | Status: SHIPPED | OUTPATIENT
Start: 2025-01-27 | End: 2026-01-27

## 2025-01-27 NOTE — ED PROVIDER NOTES
HPI   No chief complaint on file.      Chief complaint: Right knee pain    History of present illness: Patient is a 74-year-old female presenting to the emergency department with complaints of right lower extremity pain.  According to the patient, just prior to arrival in the emergency department, she had a fall on ice.  Patient states that her right leg went sideways and immediately began to experience pain primarily in her right knee extending down the leg.  The patient states that after the incident she was unable place any weight on the affected extremity.  Patient has never had symptoms like this in the past.  She denies hitting her head she denies use of any blood thinners.  Concern, the patient presents to the emergency department for further evaluation and therapy.      History provided by:  Patient   used: No            Patient History   Past Medical History:   Diagnosis Date    Encounter for screening for malignant neoplasm of colon 09/14/2018    Colon cancer screening    Nontraumatic subarachnoid hemorrhage, unspecified (Multi) 08/10/2017    Subarachnoid hemorrhage, nontraumatic    Other conditions influencing health status     Closed Fracture Of The Fifth Metatarsal    Personal history of other infectious and parasitic diseases 09/27/2019    History of wound infection    Unspecified open wound of scalp, initial encounter 09/14/2018    Scalp wound     Past Surgical History:   Procedure Laterality Date    CT HEAD ANGIO W AND WO IV CONTRAST  7/27/2013    CT HEAD ANGIO W AND WO IV CONTRAST 7/27/2013 [facility] CLINICAL LEGACY    CT HEAD ANGIO W AND WO IV CONTRAST  8/6/2013    CT HEAD ANGIO W AND WO IV CONTRAST 8/6/2013 [facility] CLINICAL LEGACY    CT HEAD ANGIO W AND WO IV CONTRAST  10/4/2017    CT HEAD ANGIO W AND WO IV CONTRAST [place] [facility] [place]    MR HEAD ANGIO W AND WO IV CONTRAST  8/2/2014    MR HEAD ANGIO W AND WO IV CONTRAST [place]2/[place] [facility] [place]    MR HEAD ANGIO W AND WO  IV CONTRAST  10/7/2019    MR HEAD ANGIO W AND WO IV CONTRAST 10[place]7/[place] [facility] [place]    MR HEAD ANGIO W AND WO IV CONTRAST  10/3/2023    MR HEAD ANGIO W AND WO IV CONTRAST 10/3/2023 [facility] [place]    MR HEAD ANGIO WO IV CONTRAST  8/10/2015    MR HEAD ANGIO WO IV CONTRAST 8/10/2015 [facility] [place]    OTHER SURGICAL HISTORY  2013    Craniotomy (Therapeutic)     No family history on file.  Social History     Tobacco Use    Smoking status: Former     Current packs/day: 0.00     Types: Cigarettes     Quit date:      Years since quittin.0    Smokeless tobacco: Never   Substance Use Topics    Alcohol use: Not Currently    Drug use: Yes     Types: Marijuana     Comment: medical marijuana       Physical Exam   ED Triage Vitals   Temp Pulse Resp BP   -- -- -- --      SpO2 Temp src Heart Rate Source Patient Position   -- -- -- --      BP Location FiO2 (%)     -- --       Physical Exam  Constitutional:       Appearance: Normal appearance.   HENT:      Head: Normocephalic and atraumatic.      Right Ear: External ear normal.      Left Ear: External ear normal.      Nose: Nose normal.      Mouth/Throat:      Mouth: Mucous membranes are moist.   Eyes:      General: Lids are normal.      Extraocular Movements: Extraocular movements intact.      Pupils: Pupils are equal, round, and reactive to light.   Cardiovascular:      Rate and Rhythm: Normal rate and regular rhythm.      Heart sounds: Normal heart sounds.   Pulmonary:      Effort: Pulmonary effort is normal.      Breath sounds: Normal breath sounds.   Abdominal:      General: Abdomen is flat.      Palpations: Abdomen is soft.      Tenderness: There is no abdominal tenderness. There is no guarding or rebound.   Musculoskeletal:         General: No deformity.      Cervical back: Normal range of motion and neck supple.      Right knee: Effusion present. Decreased range of motion. Tenderness present.   Skin:     General: Skin is warm.      Capillary Refill:  Capillary refill takes less than 2 seconds.      Coloration: Skin is not jaundiced.   Neurological:      General: No focal deficit present.      Mental Status: She is alert and oriented to person, place, and time.   Psychiatric:         Mood and Affect: Mood normal.         Behavior: Behavior normal.           ED Course & MDM   Diagnoses as of 01/31/25 1552   Closed fracture of proximal end of right tibia, unspecified fracture morphology, initial encounter                 No data recorded                                 Medical Decision Making  Medical Decision Making: Patient remained stable during her time in the emergency department.  X-ray of the right femur demonstrated no significant abnormalities meanwhile x-ray of the patient's right knee and right tib-fib demonstrated a possible impaction fracture of the lateral tibial epiphysis but no other significant abnormalities.  Subsequent CAT scan of the patient's right knee demonstrated a an acute nondisplaced lateral plateau fracture with a vertical 9 to pressed component and a lipohemarthrosis.    Patient presents to the emergency department with complaints of right-sided knee pain after a fall.  Workup was performed as above and demonstrated the presence of a tibial plateau fracture.  Patient was given Percocet twice in the emergency department.  I explained to the patient that she has a fracture and she needs to be nonweightbearing.  Patient was placed in a knee immobilizer and given crutches.  It was emphasized that she follow-up with orthopedic surgery given this injury she was given a referral for orthopedics injury and a prescription for Percocet for home use.  She was instructed to return the emergency department for any worsening symptoms.  She expressed understanding and agreement.  The patient was then discharged home in otherwise stable condition.    Amount and/or Complexity of Data Reviewed  Radiology: ordered. Decision-making details documented in ED  Course.        Procedure  Procedures     Jason Ortiz MD  01/31/25 3700

## 2025-01-27 NOTE — ED TRIAGE NOTES
"Pt BIB EMS after a mechanical fall on ice. Pt reports her R leg went \"sideways\" and she now has significant pain from the R knee down the leg and cannot bear weight on it. Pt denies dizziness, LOC or hitting her head.  "

## 2025-02-05 ENCOUNTER — TELEPHONE (OUTPATIENT)
Dept: ORTHOPEDIC SURGERY | Facility: HOSPITAL | Age: 75
End: 2025-02-05
Payer: MEDICARE

## 2025-02-05 DIAGNOSIS — S82.144A CLOSED NONDISPLACED FRACTURE OF RIGHT TIBIAL PLATEAU, INITIAL ENCOUNTER: ICD-10-CM

## 2025-02-05 PROBLEM — Z98.890 HISTORY OF SURGICAL PROCEDURE: Status: ACTIVE | Noted: 2025-02-05

## 2025-02-05 RX ORDER — ASPIRIN 81 MG/1
81 TABLET ORAL 2 TIMES DAILY
Refills: 0 | COMMUNITY
Start: 2025-02-05 | End: 2025-02-05 | Stop reason: SDUPTHER

## 2025-02-05 RX ORDER — ASPIRIN 81 MG/1
81 TABLET ORAL 2 TIMES DAILY
Qty: 60 TABLET | Refills: 0 | Status: SHIPPED | OUTPATIENT
Start: 2025-02-05 | End: 2025-03-07

## 2025-02-05 NOTE — TELEPHONE ENCOUNTER
Received secure chat message from Dr. Abreu and patient's PCP requesting to get patient in sooner for a ED follow-up.     Originally scheduled with Yue Bull PA-C on 02/20,    Called and spoke to patient and rescheduled her to see Dr. Matamoros on Monday 02/10.     Per Amarilis COLE, due to dx of non-displaced tibial plateau fx, start ASA 81mg BID.     Patient notified, PCP notified and med list updated.     Abigail Rowley LPN

## 2025-02-07 ENCOUNTER — APPOINTMENT (OUTPATIENT)
Dept: ORTHOPEDIC SURGERY | Facility: CLINIC | Age: 75
End: 2025-02-07
Payer: MEDICARE

## 2025-02-10 ENCOUNTER — HOSPITAL ENCOUNTER (OUTPATIENT)
Dept: RADIOLOGY | Facility: CLINIC | Age: 75
Discharge: HOME | End: 2025-02-10
Payer: MEDICARE

## 2025-02-10 ENCOUNTER — DOCUMENTATION (OUTPATIENT)
Dept: HOME HEALTH SERVICES | Facility: HOME HEALTH | Age: 75
End: 2025-02-10
Payer: MEDICARE

## 2025-02-10 ENCOUNTER — OFFICE VISIT (OUTPATIENT)
Dept: ORTHOPEDIC SURGERY | Facility: CLINIC | Age: 75
End: 2025-02-10
Payer: MEDICARE

## 2025-02-10 ENCOUNTER — HOME HEALTH ADMISSION (OUTPATIENT)
Dept: HOME HEALTH SERVICES | Facility: HOME HEALTH | Age: 75
End: 2025-02-10
Payer: MEDICARE

## 2025-02-10 DIAGNOSIS — S82.121A CLOSED FRACTURE OF LATERAL PORTION OF RIGHT TIBIAL PLATEAU, INITIAL ENCOUNTER: ICD-10-CM

## 2025-02-10 DIAGNOSIS — M17.11 PRIMARY OSTEOARTHRITIS OF RIGHT KNEE: ICD-10-CM

## 2025-02-10 DIAGNOSIS — R26.81 GAIT INSTABILITY: ICD-10-CM

## 2025-02-10 DIAGNOSIS — S82.101A CLOSED FRACTURE OF PROXIMAL END OF RIGHT TIBIA, UNSPECIFIED FRACTURE MORPHOLOGY, INITIAL ENCOUNTER: ICD-10-CM

## 2025-02-10 DIAGNOSIS — S82.101A CLOSED FRACTURE OF PROXIMAL END OF RIGHT TIBIA, UNSPECIFIED FRACTURE MORPHOLOGY, INITIAL ENCOUNTER: Primary | ICD-10-CM

## 2025-02-10 PROCEDURE — 27530 TREAT KNEE FRACTURE: CPT | Performed by: ORTHOPAEDIC SURGERY

## 2025-02-10 PROCEDURE — 73560 X-RAY EXAM OF KNEE 1 OR 2: CPT | Mod: RIGHT SIDE | Performed by: RADIOLOGY

## 2025-02-10 PROCEDURE — 99214 OFFICE O/P EST MOD 30 MIN: CPT | Mod: 57,25 | Performed by: ORTHOPAEDIC SURGERY

## 2025-02-10 PROCEDURE — 73560 X-RAY EXAM OF KNEE 1 OR 2: CPT | Mod: RT

## 2025-02-10 PROCEDURE — 99204 OFFICE O/P NEW MOD 45 MIN: CPT | Performed by: ORTHOPAEDIC SURGERY

## 2025-02-10 NOTE — HH CARE COORDINATION
Home Care received a Referral for Physical Therapy and Occupational Therapy. We have processed the referral for a Start of Care on 2/12/25.     If you have any questions or concerns, please feel free to contact us at [phone]. Follow the prompts, enter your five digit zip code, and you will be directed to your care team on [facility] 2.

## 2025-02-10 NOTE — PATIENT INSTRUCTIONS
The patient has a history of falls. I did complete a risk assessment for falls. A plan of care for falls was documented. Given Falls education.     Abigail Rowley LPN

## 2025-02-10 NOTE — PROGRESS NOTES
Subjective    Patient ID: Paola Gonzales is a 74 y.o. female.    HPI    The patient presents today for right knee pain. She reports that she 1/26/2025 patient has fall on ice while she was walking her dog. She was placed in a brace and she has been non weight bearing since then.  Patient is using crutches.  She and her  reported that she is unable to tolerate a walker.  She has history of left lower extremity weakness.  Her pain has since improved. She is taking Advil-tylenol combination for pain.     She has a brain aneurysm which has affected her left motor function.    Past Medical History:   Diagnosis Date    Encounter for screening for malignant neoplasm of colon 09/14/2018    Colon cancer screening    Nontraumatic subarachnoid hemorrhage, unspecified (Multi) 08/10/2017    Subarachnoid hemorrhage, nontraumatic    Other conditions influencing health status     Closed Fracture Of The Fifth Metatarsal    Personal history of other infectious and parasitic diseases 09/27/2019    History of wound infection    Unspecified open wound of scalp, initial encounter 09/14/2018    Scalp wound       Past Surgical History:   Procedure Laterality Date    CT HEAD ANGIO W AND WO IV CONTRAST  7/27/2013    CT HEAD ANGIO W AND WO IV CONTRAST 7/27/2013 [facility] CLINICAL LEGACY    CT HEAD ANGIO W AND WO IV CONTRAST  8/6/2013    CT HEAD ANGIO W AND WO IV CONTRAST 8/6/2013 [facility] CLINICAL LEGACY    CT HEAD ANGIO W AND WO IV CONTRAST  10/4/2017    [place] HEAD ANGIO W AND WO IV [place] [facility] [place]    MR HEAD ANGIO W AND WO IV CONTRAST  8/2/2014    MR HEAD ANGIO W AND WO IV CONTRAST [place] [facility] [place]    MR HEAD ANGIO W AND WO IV CONTRAST  10/7/2019    MR HEAD ANGIO W AND WO IV CONTRAST [place] [facility] [place]    MR HEAD ANGIO W AND WO IV CONTRAST  10/3/2023    MR HEAD ANGIO W AND WO IV CONTRAST [place]2023 [facility] [place]    MR HEAD ANGIO WO IV CONTRAST  8/10/2015    MR HEAD ANGIO WO IV CONTRAST [place] [facility] [place]    OTHER SURGICAL HISTORY  09/20/2013    Craniotomy (Therapeutic)       Objective   ROS  The patients full medical history, surgical history, medications, allergies, family, medical history, social history, and a complete 14 point review of systems is documented in the medical record on the signed, scanned medical intake sheet or reviewed in the history of present illness. Review of systems otherwise negative    Gen: The patient is alert and oriented ×3, is in no acute distress, and appear their stated age and weight.    Psychiatric: Mood and affect are appropriate.    Eyes: Sclera are white, and pupils are round and symmetric.    ENT: Mucous membranes are moist.     Neck: Supple. Thyroid is midline.    Respiratory: Respirations are nonlabored, chest rise is symmetric.    Cardiac: Rate is regular by palpation of distal pulses.     Abdomen: Nondistended.    Integument: No obvious cutaneous lesions are noted. No signs of lymphangitis. No signs of systemic edema.    Ortho Exam    Exam of the right knee revealed no effusion in the knee.The knee came to full extension, flexion was to 80 degrees and ligamentous stability was full. The neurovascular examination of extremity was intact. The neurological exam including motor and sensory exam was performed. The vascular examination including palpitation of pulses and capillary refill of the foot was performed and determined to be intact     Relevant Results  I personally reviewed  radiograph that reveals lateral tibial plateau  fracture, that's minimally displaced, slight impaction of subchondral bone, articular surface is well preserved and good alinement. She has preexisting osteoarthritis of the knee with joint space narrowing and lateral overhanging osteophyte.       Previous Knee radiograph shows Equivocal impaction fracture of the lateral tibial epiphysis  though favored to represent osteophyte if patient cannot bear weight  recommend CT of the right  knee for further evaluation.      2. Progression of moderate tricompartmental osteoarthrosis and  increased size of moderate joint effusion.      3. No acute osseous abnormality of the right femur.      Assessment/Plan   Encounter Diagnoses:  Closed fracture of proximal end of right tibia, unspecified fracture morphology, initial encounter    Gait instability    Closed fracture of lateral portion of right tibial plateau, initial encounter    Primary osteoarthritis of right knee      Patient sustained a closed fracture of proximal end of right tibia. .We discussed treatment option I recommended closed treatment.  Fracture is minimally displaced.  Patient also have persisting right knee osteoarthritis.  I explained condition to patient and expected recovery. Radiograph shows  lateral tibial plateau  fracture, that's minimally displaced, slight impaction of subchondral bone, articular surface is well preserved and good alinement. She has preexisting osteoarthritis of the knee with joint space narrowing and lateral overhanging osteophyte. .  Patient will continue to be nonweightbearing for the next 2 weeks.  I gave patient prescription for a hinge knee brace. Patient may start weight bearing as tolerated after 2 weeks.   Patient may take over-the-counter anti-inflammatories as needed for pain.  I recommend elevation of the leg.  I would like to see patient back in 3 months for evaluation and at that time we will obtain x-ray of the right knee.      Orders Placed This Encounter    XR knee right 1-2 views    Referral to Home Health     No follow-ups on file.    Scribe Attestation  By signing my name below, I, Dave Matamoros MD, [place] attest that this documentation has been prepared under the direction and in the presence of Dave Matamoros MD. All medical record entries made by the Scribe were at my direction or personally dictated by me. I have reviewed the chart and agree that the record accurately reflects my  personal performance of the history, physical exam, discussion and plan.

## 2025-02-20 ENCOUNTER — APPOINTMENT (OUTPATIENT)
Dept: ORTHOPEDIC SURGERY | Facility: CLINIC | Age: 75
End: 2025-02-20
Payer: MEDICARE

## 2025-02-21 ENCOUNTER — HOME CARE VISIT (OUTPATIENT)
Dept: HOME HEALTH SERVICES | Facility: HOME HEALTH | Age: 75
End: 2025-02-21
Payer: MEDICARE

## 2025-02-21 PROCEDURE — G0152 HHCP-SERV OF OT,EA 15 MIN: HCPCS | Mod: HHH

## 2025-02-21 PROCEDURE — 169592 NO-PAY CLAIM PROCEDURE

## 2025-02-21 PROCEDURE — G0151 HHCP-SERV OF PT,EA 15 MIN: HCPCS | Mod: HHH

## 2025-02-21 ASSESSMENT — ACTIVITIES OF DAILY LIVING (ADL)
WASHING_LB_CURRENT_FUNCTION: MINIMUM ASSIST
WASHING_UPB_CURRENT_FUNCTION: MINIMUM ASSIST
DRESSING_LB_CURRENT_FUNCTION: MINIMUM ASSIST
BATHING ASSESSED: 1
DRESSING_UB_CURRENT_FUNCTION: SUPERVISION
BATHING_CURRENT_FUNCTION: MINIMUM ASSIST

## 2025-02-22 SDOH — ECONOMIC STABILITY: HOUSING INSECURITY: HOME SAFETY: FALL RISK. WB RESTRICTIONS.

## 2025-02-22 ASSESSMENT — ENCOUNTER SYMPTOMS
MUSCLE WEAKNESS: 1
DENIES PAIN: 1
PERSON REPORTING PAIN: PATIENT
LIMITED RANGE OF MOTION: 1

## 2025-02-22 ASSESSMENT — ACTIVITIES OF DAILY LIVING (ADL)
ENTERING_EXITING_HOME: MINIMUM ASSIST
OASIS_M1830: 04

## 2025-02-25 ENCOUNTER — HOME CARE VISIT (OUTPATIENT)
Dept: HOME HEALTH SERVICES | Facility: HOME HEALTH | Age: 75
End: 2025-02-25
Payer: MEDICARE

## 2025-02-25 PROCEDURE — G0151 HHCP-SERV OF PT,EA 15 MIN: HCPCS | Mod: HHH

## 2025-02-25 SDOH — HEALTH STABILITY: PHYSICAL HEALTH: EXERCISE TYPE: ADVANCED HEP

## 2025-02-25 SDOH — ECONOMIC STABILITY: HOUSING INSECURITY
HOME SAFETY: REVIEWED WEARING SCHEDULE FOR HER KNEE BRACE. OPEN DURING THE DAY 0- 90 DEGREES AND LOCKED IN EXTENSTION AT NIGHT.

## 2025-02-27 ENCOUNTER — TELEPHONE (OUTPATIENT)
Dept: ORTHOPEDIC SURGERY | Facility: HOSPITAL | Age: 75
End: 2025-02-27
Payer: MEDICARE

## 2025-02-27 NOTE — TELEPHONE ENCOUNTER
Received call from Della - [facility] OT asking about restrictions regarding patient's brace.     Patient DX with non-operative tibial plateau fx.   Per LOV note on 02/10/25 with Dr. Matamoros, patient was to start weightbearing 2 weeks after appt which would put her WB on Monday 02/25.   Per Dr. Matamoros's protocols for tibial plateau fxs  ROM, HEP and no straight leg raises   Brace to be on at all times, unlocked with ambulation, brace locked at night.     No further questions, Della will relay to patient's therapy team as well.     Abigail Rowley LPN    
Yes

## 2025-03-03 ENCOUNTER — HOME CARE VISIT (OUTPATIENT)
Dept: HOME HEALTH SERVICES | Facility: HOME HEALTH | Age: 75
End: 2025-03-03
Payer: MEDICARE

## 2025-03-03 PROCEDURE — G0152 HHCP-SERV OF OT,EA 15 MIN: HCPCS | Mod: HHH

## 2025-03-03 ASSESSMENT — ACTIVITIES OF DAILY LIVING (ADL)
DRESSING_UB_CURRENT_FUNCTION: STAND BY ASSIST
WASHING_UPB_CURRENT_FUNCTION: SUPERVISION
DRESSING_LB_CURRENT_FUNCTION: MINIMUM ASSIST
WASHING_LB_CURRENT_FUNCTION: SUPERVISION

## 2025-03-03 ASSESSMENT — ENCOUNTER SYMPTOMS
DENIES PAIN: 1
PERSON REPORTING PAIN: PATIENT

## 2025-03-04 ENCOUNTER — HOME CARE VISIT (OUTPATIENT)
Dept: HOME HEALTH SERVICES | Facility: HOME HEALTH | Age: 75
End: 2025-03-04
Payer: MEDICARE

## 2025-03-04 PROCEDURE — G0151 HHCP-SERV OF PT,EA 15 MIN: HCPCS | Mod: HHH

## 2025-03-04 ASSESSMENT — ENCOUNTER SYMPTOMS
PERSON REPORTING PAIN: PATIENT
DENIES PAIN: 1
LIMITED RANGE OF MOTION: 1
MUSCLE WEAKNESS: 1

## 2025-03-06 ENCOUNTER — HOME CARE VISIT (OUTPATIENT)
Dept: HOME HEALTH SERVICES | Facility: HOME HEALTH | Age: 75
End: 2025-03-06
Payer: MEDICARE

## 2025-03-06 ENCOUNTER — APPOINTMENT (OUTPATIENT)
Dept: ORTHOPEDIC SURGERY | Facility: CLINIC | Age: 75
End: 2025-03-06
Payer: MEDICARE

## 2025-03-12 ENCOUNTER — APPOINTMENT (OUTPATIENT)
Dept: HOME HEALTH SERVICES | Facility: HOME HEALTH | Age: 75
End: 2025-03-12
Payer: MEDICARE

## 2025-03-13 ENCOUNTER — APPOINTMENT (OUTPATIENT)
Dept: HOME HEALTH SERVICES | Facility: HOME HEALTH | Age: 75
End: 2025-03-13
Payer: MEDICARE

## 2025-03-13 ENCOUNTER — HOME CARE VISIT (OUTPATIENT)
Dept: HOME HEALTH SERVICES | Facility: HOME HEALTH | Age: 75
End: 2025-03-13
Payer: MEDICARE

## 2025-03-13 PROCEDURE — G0151 HHCP-SERV OF PT,EA 15 MIN: HCPCS | Mod: HHH

## 2025-03-13 SDOH — ECONOMIC STABILITY: HOUSING INSECURITY: HOME SAFETY: REVIEWED USE OF BRACE

## 2025-03-13 ASSESSMENT — ENCOUNTER SYMPTOMS
OCCASIONAL FEELINGS OF UNSTEADINESS: 0
PERSON REPORTING PAIN: PATIENT
LIMITED RANGE OF MOTION: 1
MUSCLE WEAKNESS: 1
DENIES PAIN: 1

## 2025-03-13 ASSESSMENT — ACTIVITIES OF DAILY LIVING (ADL)
HOME_HEALTH_OASIS: 00
OASIS_M1830: 00

## 2025-03-15 ENCOUNTER — APPOINTMENT (OUTPATIENT)
Dept: HOME HEALTH SERVICES | Facility: HOME HEALTH | Age: 75
End: 2025-03-15
Payer: MEDICARE

## 2025-03-20 DIAGNOSIS — Z12.31 ENCOUNTER FOR SCREENING MAMMOGRAM FOR MALIGNANT NEOPLASM OF BREAST: Primary | ICD-10-CM

## 2025-03-20 DIAGNOSIS — R25.1 TREMOR, UNSPECIFIED: ICD-10-CM

## 2025-03-20 RX ORDER — PRIMIDONE 250 MG/1
250 TABLET ORAL 2 TIMES DAILY
Qty: 180 TABLET | Refills: 2 | Status: SHIPPED | OUTPATIENT
Start: 2025-03-20

## 2025-04-04 ENCOUNTER — HOSPITAL ENCOUNTER (OUTPATIENT)
Dept: RADIOLOGY | Facility: CLINIC | Age: 75
Discharge: HOME | End: 2025-04-04
Payer: MEDICARE

## 2025-04-04 VITALS — WEIGHT: 149.03 LBS | HEIGHT: 62 IN | BODY MASS INDEX: 27.43 KG/M2

## 2025-04-04 DIAGNOSIS — Z12.31 ENCOUNTER FOR SCREENING MAMMOGRAM FOR MALIGNANT NEOPLASM OF BREAST: ICD-10-CM

## 2025-04-04 PROCEDURE — 77063 BREAST TOMOSYNTHESIS BI: CPT

## 2025-04-30 DIAGNOSIS — Z00.00 ANNUAL PHYSICAL EXAM: ICD-10-CM

## 2025-05-02 DIAGNOSIS — Z00.00 ANNUAL PHYSICAL EXAM: ICD-10-CM

## 2025-05-18 NOTE — PROGRESS NOTES
Subjective    Patient ID: Paola Gonzales is a 74 y.o. female.    HPI    The patient presents today for right knee pain. She reports that she 1/26/2025 patient has fall on ice while she was walking her dog. Patient is doing well, ambulating independently.   Her pain has since improved. 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    Past Medical History:   Diagnosis Date    Encounter for screening for malignant neoplasm of colon 09/14/2018    Colon cancer screening    Nontraumatic subarachnoid hemorrhage, unspecified (Multi) 08/10/2017    Subarachnoid hemorrhage, nontraumatic    Other conditions influencing health status     Closed Fracture Of The Fifth Metatarsal    Personal history of other infectious and parasitic diseases 09/27/2019    History of wound infection    Unspecified open wound of scalp, initial encounter 09/14/2018    Scalp wound       Past Surgical History:   Procedure Laterality Date    CT HEAD ANGIO W AND WO IV CONTRAST  7/27/2013    CT HEAD ANGIO W AND WO IV CONTRAST 7/27/2013 [facility]ICAL [facility]GAC[facility]    CT HEAD ANGIO W AND WO IV CONTRAST  8/6/2013    CT HEAD ANGIO W AND WO IV CONTRAST 8/6/2013 [facility] CLINICAL LEGACY    CT HEAD ANGIO W AND WO IV CONTRAST  10/4/2017    CT HEAD ANGIO W AND WO IV CONTRAST [place] [facility] [place]    MR HEAD ANGIO W AND WO IV CONTRAST  8/2/2014    MR HEAD ANGIO W AND WO IV CONTRAST [place]2/2014 [facility] [place]    MR HEAD ANGIO W AND WO IV CONTRAST  10/7/2019    MR HEAD ANGIO W AND WO IV CONTRAST [place]7/[place] [facility] [place]    MR HEAD ANGIO W AND WO IV CONTRAST  10/3/2023    MR HEAD ANGIO W AND WO IV CONTRAST 10[place]3/2023 [facility] [place]    MR HEAD ANGIO WO IV CONTRAST  8/10/2015    MR HEAD ANGIO WO IV CONTRAST 8[place]10/2015 [facility] [place]    OTHER SURGICAL HISTORY  09/20/2013    Craniotomy (Therapeutic)       Objective   ROS     Ortho Exam    The patient is well-nourished and well-developed and in no acute  distress. The patient displayed normal mood and affect. The patient's pupils were equal, round sclerae are white. Patient's respirations appear to be regular and unlabored.    Exam of the right knee revealed no effusion in the knee.The knee came to full extension, flexion was to 120 degrees and ligamentous stability was full. T    Relevant Results  I personally reviewed  radiograph that reveals healed lateral tibial plateau  fracture, articular surface is well preserved and good alinement. She has preexisting osteoarthritis of the knee with joint space narrowing and lateral overhanging osteophyte.           Assessment/Plan   Encounter Diagnoses:  Closed fracture of proximal end of right tibia, unspecified fracture morphology, initial encounter      Patient presents for a follow on her closed fracture of proximal end of right tibia.  I recommended closed treatment.  Fracture was minimally displaced.  Patient also have persisting right knee osteoarthritis.  Patient is doing well, she denies any pain. Patient is ambulating without any assistive device.  Patient is weightbearing as tolerated. No restrictions.  She may no longer use the hinge knee brace. Discussed fall prevention at home. Patient will follow up with as needed in the future.         Orders Placed This Encounter    XR knee right 1-2 views     No follow-ups on file.    Scribe Attestation  By signing my name below, IVianey Scribe attest that this documentation has been prepared under the direction and in the presence of Dave Matamoros MD. All medical record entries made by the Scribe were at my direction or personally dictated by me. I have reviewed the chart and agree that the record accurately reflects my personal performance of the history, physical exam, discussion and plan.

## 2025-05-19 ENCOUNTER — OFFICE VISIT (OUTPATIENT)
Dept: ORTHOPEDIC SURGERY | Facility: CLINIC | Age: 75
End: 2025-05-19
Payer: MEDICARE

## 2025-05-19 ENCOUNTER — HOSPITAL ENCOUNTER (OUTPATIENT)
Dept: RADIOLOGY | Facility: CLINIC | Age: 75
Discharge: HOME | End: 2025-05-19
Payer: MEDICARE

## 2025-05-19 DIAGNOSIS — M17.11 PRIMARY OSTEOARTHRITIS OF RIGHT KNEE: Primary | ICD-10-CM

## 2025-05-19 DIAGNOSIS — S82.101A CLOSED FRACTURE OF PROXIMAL END OF RIGHT TIBIA, UNSPECIFIED FRACTURE MORPHOLOGY, INITIAL ENCOUNTER: ICD-10-CM

## 2025-05-19 PROCEDURE — 73560 X-RAY EXAM OF KNEE 1 OR 2: CPT | Mod: RT

## 2025-05-19 PROCEDURE — 99214 OFFICE O/P EST MOD 30 MIN: CPT | Performed by: ORTHOPAEDIC SURGERY

## 2025-05-19 PROCEDURE — 73560 X-RAY EXAM OF KNEE 1 OR 2: CPT | Mod: RIGHT SIDE | Performed by: RADIOLOGY

## 2025-05-20 DIAGNOSIS — I15.9 SECONDARY HYPERTENSION: ICD-10-CM

## 2025-05-20 RX ORDER — AMLODIPINE BESYLATE 2.5 MG/1
2.5 TABLET ORAL DAILY
Qty: 90 TABLET | Refills: 1 | Status: SHIPPED | OUTPATIENT
Start: 2025-05-20 | End: 2026-05-20

## 2025-06-05 ENCOUNTER — TELEPHONE (OUTPATIENT)
Dept: PRIMARY CARE | Facility: CLINIC | Age: 75
End: 2025-06-05
Payer: MEDICARE

## 2025-06-05 DIAGNOSIS — I15.9 SECONDARY HYPERTENSION: ICD-10-CM

## 2025-06-05 RX ORDER — AMLODIPINE BESYLATE 5 MG/1
5 TABLET ORAL DAILY
Qty: 90 TABLET | Refills: 1 | Status: SHIPPED | OUTPATIENT
Start: 2025-06-05 | End: 2025-12-02

## 2025-08-09 ENCOUNTER — LAB (OUTPATIENT)
Dept: LAB | Facility: HOSPITAL | Age: 75
End: 2025-08-09
Payer: MEDICARE

## 2025-08-09 DIAGNOSIS — Z00.00 ENCOUNTER FOR GENERAL ADULT MEDICAL EXAMINATION WITHOUT ABNORMAL FINDINGS: Primary | ICD-10-CM

## 2025-08-09 LAB
25(OH)D3 SERPL-MCNC: 23 NG/ML (ref 30–100)
ALBUMIN SERPL BCP-MCNC: 4.3 G/DL (ref 3.4–5)
ALP SERPL-CCNC: 71 U/L (ref 33–136)
ALT SERPL W P-5'-P-CCNC: 26 U/L (ref 7–45)
ANION GAP SERPL CALC-SCNC: 16 MMOL/L (ref 10–20)
APPEARANCE UR: CLEAR
AST SERPL W P-5'-P-CCNC: 21 U/L (ref 9–39)
BASOPHILS # BLD AUTO: 0.02 X10*3/UL (ref 0–0.1)
BASOPHILS NFR BLD AUTO: 0.3 %
BILIRUB SERPL-MCNC: 0.4 MG/DL (ref 0–1.2)
BILIRUB UR STRIP.AUTO-MCNC: NEGATIVE MG/DL
BUN SERPL-MCNC: 10 MG/DL (ref 6–23)
CALCIUM SERPL-MCNC: 9.3 MG/DL (ref 8.6–10.3)
CHLORIDE SERPL-SCNC: 104 MMOL/L (ref 98–107)
CHOLEST SERPL-MCNC: 159 MG/DL (ref 0–199)
CHOLESTEROL/HDL RATIO: 3
CO2 SERPL-SCNC: 25 MMOL/L (ref 21–32)
COLOR UR: ABNORMAL
CREAT SERPL-MCNC: 0.59 MG/DL (ref 0.5–1.05)
CRP SERPL HS-MCNC: 3.1 MG/L
EGFRCR SERPLBLD CKD-EPI 2021: >90 ML/MIN/1.73M*2
EOSINOPHIL # BLD AUTO: 0.09 X10*3/UL (ref 0–0.4)
EOSINOPHIL NFR BLD AUTO: 1.5 %
ERYTHROCYTE [DISTWIDTH] IN BLOOD BY AUTOMATED COUNT: 13.2 % (ref 11.5–14.5)
EST. AVERAGE GLUCOSE BLD GHB EST-MCNC: 111 MG/DL
GLUCOSE SERPL-MCNC: 92 MG/DL (ref 74–99)
GLUCOSE UR STRIP.AUTO-MCNC: NORMAL MG/DL
HBA1C MFR BLD: 5.5 % (ref ?–5.7)
HCT VFR BLD AUTO: 46.2 % (ref 36–46)
HDLC SERPL-MCNC: 53.1 MG/DL
HGB BLD-MCNC: 15.1 G/DL (ref 12–16)
IMM GRANULOCYTES # BLD AUTO: 0.02 X10*3/UL (ref 0–0.5)
IMM GRANULOCYTES NFR BLD AUTO: 0.3 % (ref 0–0.9)
KETONES UR STRIP.AUTO-MCNC: NEGATIVE MG/DL
LDLC SERPL CALC-MCNC: 66 MG/DL
LEUKOCYTE ESTERASE UR QL STRIP.AUTO: ABNORMAL
LYMPHOCYTES # BLD AUTO: 1.83 X10*3/UL (ref 0.8–3)
LYMPHOCYTES NFR BLD AUTO: 30.1 %
MCH RBC QN AUTO: 31.2 PG (ref 26–34)
MCHC RBC AUTO-ENTMCNC: 32.7 G/DL (ref 32–36)
MCV RBC AUTO: 96 FL (ref 80–100)
MONOCYTES # BLD AUTO: 0.4 X10*3/UL (ref 0.05–0.8)
MONOCYTES NFR BLD AUTO: 6.6 %
MUCOUS THREADS #/AREA URNS AUTO: NORMAL /LPF
NEUTROPHILS # BLD AUTO: 3.71 X10*3/UL (ref 1.6–5.5)
NEUTROPHILS NFR BLD AUTO: 61.2 %
NITRITE UR QL STRIP.AUTO: NEGATIVE
NON HDL CHOLESTEROL: 106 MG/DL (ref 0–149)
NRBC BLD-RTO: 0 /100 WBCS (ref 0–0)
PH UR STRIP.AUTO: 7 [PH]
PLATELET # BLD AUTO: 294 X10*3/UL (ref 150–450)
POTASSIUM SERPL-SCNC: 3.8 MMOL/L (ref 3.5–5.3)
PROT SERPL-MCNC: 6.5 G/DL (ref 6.4–8.2)
PROT UR STRIP.AUTO-MCNC: NEGATIVE MG/DL
RBC # BLD AUTO: 4.84 X10*6/UL (ref 4–5.2)
RBC # UR STRIP.AUTO: NEGATIVE MG/DL
RBC #/AREA URNS AUTO: NORMAL /HPF
SODIUM SERPL-SCNC: 141 MMOL/L (ref 136–145)
SP GR UR STRIP.AUTO: 1.01
SQUAMOUS #/AREA URNS AUTO: NORMAL /HPF
TRIGL SERPL-MCNC: 201 MG/DL (ref 0–149)
TSH SERPL-ACNC: 1.8 MIU/L (ref 0.44–3.98)
UROBILINOGEN UR STRIP.AUTO-MCNC: NORMAL MG/DL
VLDL: 40 MG/DL (ref 0–40)
WBC # BLD AUTO: 6.1 X10*3/UL (ref 4.4–11.3)
WBC #/AREA URNS AUTO: NORMAL /HPF

## 2025-08-09 PROCEDURE — 81001 URINALYSIS AUTO W/SCOPE: CPT

## 2025-08-09 PROCEDURE — 84443 ASSAY THYROID STIM HORMONE: CPT

## 2025-08-09 PROCEDURE — 87086 URINE CULTURE/COLONY COUNT: CPT

## 2025-08-09 PROCEDURE — 82306 VITAMIN D 25 HYDROXY: CPT

## 2025-08-09 PROCEDURE — 80053 COMPREHEN METABOLIC PANEL: CPT

## 2025-08-09 PROCEDURE — 80061 LIPID PANEL: CPT

## 2025-08-09 PROCEDURE — 85025 COMPLETE CBC W/AUTO DIFF WBC: CPT

## 2025-08-09 PROCEDURE — 83036 HEMOGLOBIN GLYCOSYLATED A1C: CPT

## 2025-08-09 PROCEDURE — 86141 C-REACTIVE PROTEIN HS: CPT

## 2025-08-11 LAB
BACTERIA UR CULT: NORMAL
HOLD SPECIMEN: NORMAL

## 2025-08-12 ENCOUNTER — APPOINTMENT (OUTPATIENT)
Facility: CLINIC | Age: 75
End: 2025-08-12
Payer: MEDICARE

## 2025-08-12 ENCOUNTER — OFFICE VISIT (OUTPATIENT)
Facility: CLINIC | Age: 75
End: 2025-08-12
Payer: MEDICARE

## 2025-08-12 VITALS
DIASTOLIC BLOOD PRESSURE: 80 MMHG | SYSTOLIC BLOOD PRESSURE: 120 MMHG | HEIGHT: 61 IN | WEIGHT: 156.53 LBS | BODY MASS INDEX: 29.55 KG/M2 | HEART RATE: 86 BPM | OXYGEN SATURATION: 94 %

## 2025-08-12 VITALS — HEIGHT: 61 IN | BODY MASS INDEX: 29.55 KG/M2 | HEART RATE: 86 BPM | WEIGHT: 156.53 LBS | OXYGEN SATURATION: 94 %

## 2025-08-12 DIAGNOSIS — Z00.00 ROUTINE GENERAL MEDICAL EXAMINATION AT A HEALTH CARE FACILITY: Primary | ICD-10-CM

## 2025-08-12 PROBLEM — J44.1 ACUTE EXACERBATION OF CHRONIC OBSTRUCTIVE PULMONARY DISEASE (MULTI): Status: RESOLVED | Noted: 2024-08-21 | Resolved: 2025-08-12

## 2025-08-12 PROCEDURE — 1126F AMNT PAIN NOTED NONE PRSNT: CPT | Performed by: INTERNAL MEDICINE

## 2025-08-12 PROCEDURE — 1170F FXNL STATUS ASSESSED: CPT | Performed by: INTERNAL MEDICINE

## 2025-08-12 PROCEDURE — 3079F DIAST BP 80-89 MM HG: CPT | Performed by: INTERNAL MEDICINE

## 2025-08-12 PROCEDURE — 3074F SYST BP LT 130 MM HG: CPT | Performed by: INTERNAL MEDICINE

## 2025-08-12 PROCEDURE — 1159F MED LIST DOCD IN RCRD: CPT | Performed by: INTERNAL MEDICINE

## 2025-08-12 PROCEDURE — G0439 PPPS, SUBSEQ VISIT: HCPCS | Performed by: INTERNAL MEDICINE

## 2025-08-12 ASSESSMENT — ENCOUNTER SYMPTOMS
EYE REDNESS: 0
BRUISES/BLEEDS EASILY: 0
SINUS PRESSURE: 0
HEADACHES: 0
JOINT SWELLING: 0
NAUSEA: 0
ADENOPATHY: 0
CONSTIPATION: 0
WHEEZING: 0
DIARRHEA: 0
FEVER: 0
TREMORS: 0
SORE THROAT: 0
NERVOUS/ANXIOUS: 0
BLOOD IN STOOL: 0
DYSURIA: 0
HEADACHES: 0
SINUS PRESSURE: 0
HEMATURIA: 0
BACK PAIN: 0
SHORTNESS OF BREATH: 0
ARTHRALGIAS: 0
SLEEP DISTURBANCE: 0
FREQUENCY: 0
CHEST TIGHTNESS: 0
FEVER: 0
EYE REDNESS: 0
DYSURIA: 0
FLANK PAIN: 0
AGITATION: 0
APPETITE CHANGE: 0
VOMITING: 0
SHORTNESS OF BREATH: 0
FATIGUE: 0
NERVOUS/ANXIOUS: 0
FACIAL SWELLING: 0
BRUISES/BLEEDS EASILY: 0
FREQUENCY: 0
ABDOMINAL PAIN: 0
COUGH: 0
COUGH: 0
MYALGIAS: 0
NAUSEA: 0
CONSTIPATION: 0
ABDOMINAL PAIN: 0
SORE THROAT: 0
EYE PAIN: 0
FLANK PAIN: 0
POLYDIPSIA: 0
FATIGUE: 0
HEMATURIA: 0
MYALGIAS: 0
CONFUSION: 0
DIZZINESS: 0
WHEEZING: 0
SLEEP DISTURBANCE: 0
JOINT SWELLING: 0
PALPITATIONS: 0
CHEST TIGHTNESS: 0
APNEA: 0
NUMBNESS: 0
DIZZINESS: 0
NUMBNESS: 0
EYE PAIN: 0
APPETITE CHANGE: 0
CONFUSION: 0
VOMITING: 0
PALPITATIONS: 0
ADENOPATHY: 0
ARTHRALGIAS: 0
AGITATION: 0
TREMORS: 0
POLYDIPSIA: 0
BACK PAIN: 0
BLOOD IN STOOL: 0
FACIAL SWELLING: 0
APNEA: 0
DIARRHEA: 0

## 2025-08-12 ASSESSMENT — PAIN SCALES - GENERAL
PAINLEVEL_OUTOF10: 0-NO PAIN
PAINLEVEL_OUTOF10: 0-NO PAIN

## 2025-08-12 ASSESSMENT — ACTIVITIES OF DAILY LIVING (ADL)
TAKING_MEDICATION: INDEPENDENT
DOING_HOUSEWORK: INDEPENDENT
BATHING: INDEPENDENT
MANAGING_FINANCES: NEEDS ASSISTANCE
GROCERY_SHOPPING: NEEDS ASSISTANCE
DRESSING: INDEPENDENT